# Patient Record
Sex: MALE | Race: ASIAN | NOT HISPANIC OR LATINO | Employment: FULL TIME | URBAN - METROPOLITAN AREA
[De-identification: names, ages, dates, MRNs, and addresses within clinical notes are randomized per-mention and may not be internally consistent; named-entity substitution may affect disease eponyms.]

---

## 2021-03-12 ENCOUNTER — IMMUNIZATION (OUTPATIENT)
Dept: PEDIATRICS | Facility: CLINIC | Age: 36
End: 2021-03-12
Payer: COMMERCIAL

## 2021-03-12 PROCEDURE — 0001A PR COVID VAC PFIZER DIL RECON 30 MCG/0.3 ML IM: CPT

## 2021-03-12 PROCEDURE — 91300 PR COVID VAC PFIZER DIL RECON 30 MCG/0.3 ML IM: CPT

## 2021-04-02 ENCOUNTER — IMMUNIZATION (OUTPATIENT)
Dept: PEDIATRICS | Facility: CLINIC | Age: 36
End: 2021-04-02
Attending: INTERNAL MEDICINE
Payer: COMMERCIAL

## 2021-04-02 PROCEDURE — 0002A PR COVID VAC PFIZER DIL RECON 30 MCG/0.3 ML IM: CPT

## 2021-04-02 PROCEDURE — 91300 PR COVID VAC PFIZER DIL RECON 30 MCG/0.3 ML IM: CPT

## 2021-04-03 ENCOUNTER — HEALTH MAINTENANCE LETTER (OUTPATIENT)
Age: 36
End: 2021-04-03

## 2021-09-18 ENCOUNTER — HEALTH MAINTENANCE LETTER (OUTPATIENT)
Age: 36
End: 2021-09-18

## 2021-12-23 ENCOUNTER — IMMUNIZATION (OUTPATIENT)
Dept: NURSING | Facility: CLINIC | Age: 36
End: 2021-12-23
Payer: COMMERCIAL

## 2021-12-23 PROCEDURE — 0064A COVID-19,PF,MODERNA (18+ YRS BOOSTER .25ML): CPT

## 2021-12-23 PROCEDURE — 91306 COVID-19,PF,MODERNA (18+ YRS BOOSTER .25ML): CPT

## 2022-04-24 ENCOUNTER — HEALTH MAINTENANCE LETTER (OUTPATIENT)
Age: 37
End: 2022-04-24

## 2022-11-19 ENCOUNTER — HEALTH MAINTENANCE LETTER (OUTPATIENT)
Age: 37
End: 2022-11-19

## 2023-06-01 ENCOUNTER — HEALTH MAINTENANCE LETTER (OUTPATIENT)
Age: 38
End: 2023-06-01

## 2024-03-08 ENCOUNTER — OFFICE VISIT (OUTPATIENT)
Dept: FAMILY MEDICINE | Facility: CLINIC | Age: 39
End: 2024-03-08
Payer: COMMERCIAL

## 2024-03-08 VITALS
HEIGHT: 65 IN | BODY MASS INDEX: 35.99 KG/M2 | SYSTOLIC BLOOD PRESSURE: 119 MMHG | OXYGEN SATURATION: 100 % | HEART RATE: 81 BPM | DIASTOLIC BLOOD PRESSURE: 74 MMHG | TEMPERATURE: 98.1 F | WEIGHT: 216 LBS | RESPIRATION RATE: 18 BRPM

## 2024-03-08 DIAGNOSIS — Z23 NEED FOR TDAP VACCINATION: ICD-10-CM

## 2024-03-08 DIAGNOSIS — Z00.00 ROUTINE GENERAL MEDICAL EXAMINATION AT A HEALTH CARE FACILITY: Primary | ICD-10-CM

## 2024-03-08 DIAGNOSIS — Z00.00 VISIT FOR PREVENTIVE HEALTH EXAMINATION: ICD-10-CM

## 2024-03-08 DIAGNOSIS — Z11.59 NEED FOR HEPATITIS C SCREENING TEST: ICD-10-CM

## 2024-03-08 DIAGNOSIS — Z13.220 SCREENING FOR HYPERLIPIDEMIA: ICD-10-CM

## 2024-03-08 DIAGNOSIS — Z71.89 ADVANCE CARE PLANNING: ICD-10-CM

## 2024-03-08 DIAGNOSIS — Z23 NEED FOR HEPATITIS B VACCINATION: ICD-10-CM

## 2024-03-08 DIAGNOSIS — Z11.4 ENCOUNTER FOR SCREENING FOR HIV: ICD-10-CM

## 2024-03-08 DIAGNOSIS — E66.01 MORBID OBESITY (H): ICD-10-CM

## 2024-03-08 LAB
BASOPHILS # BLD AUTO: 0 10E3/UL (ref 0–0.2)
BASOPHILS NFR BLD AUTO: 0 %
EOSINOPHIL # BLD AUTO: 0.2 10E3/UL (ref 0–0.7)
EOSINOPHIL NFR BLD AUTO: 3 %
ERYTHROCYTE [DISTWIDTH] IN BLOOD BY AUTOMATED COUNT: 12.6 % (ref 10–15)
HCT VFR BLD AUTO: 42.3 % (ref 40–53)
HGB BLD-MCNC: 14.4 G/DL (ref 13.3–17.7)
IMM GRANULOCYTES # BLD: 0 10E3/UL
IMM GRANULOCYTES NFR BLD: 1 %
LYMPHOCYTES # BLD AUTO: 1.9 10E3/UL (ref 0.8–5.3)
LYMPHOCYTES NFR BLD AUTO: 30 %
MCH RBC QN AUTO: 28.7 PG (ref 26.5–33)
MCHC RBC AUTO-ENTMCNC: 34 G/DL (ref 31.5–36.5)
MCV RBC AUTO: 84 FL (ref 78–100)
MONOCYTES # BLD AUTO: 0.4 10E3/UL (ref 0–1.3)
MONOCYTES NFR BLD AUTO: 6 %
NEUTROPHILS # BLD AUTO: 3.8 10E3/UL (ref 1.6–8.3)
NEUTROPHILS NFR BLD AUTO: 60 %
PLATELET # BLD AUTO: 270 10E3/UL (ref 150–450)
RBC # BLD AUTO: 5.02 10E6/UL (ref 4.4–5.9)
WBC # BLD AUTO: 6.4 10E3/UL (ref 4–11)

## 2024-03-08 PROCEDURE — 86803 HEPATITIS C AB TEST: CPT | Performed by: NURSE PRACTITIONER

## 2024-03-08 PROCEDURE — 80061 LIPID PANEL: CPT | Performed by: NURSE PRACTITIONER

## 2024-03-08 PROCEDURE — 80053 COMPREHEN METABOLIC PANEL: CPT | Performed by: NURSE PRACTITIONER

## 2024-03-08 PROCEDURE — 99385 PREV VISIT NEW AGE 18-39: CPT | Mod: 25 | Performed by: NURSE PRACTITIONER

## 2024-03-08 PROCEDURE — 90746 HEPB VACCINE 3 DOSE ADULT IM: CPT | Performed by: NURSE PRACTITIONER

## 2024-03-08 PROCEDURE — 85025 COMPLETE CBC W/AUTO DIFF WBC: CPT | Performed by: NURSE PRACTITIONER

## 2024-03-08 PROCEDURE — 87389 HIV-1 AG W/HIV-1&-2 AB AG IA: CPT | Performed by: NURSE PRACTITIONER

## 2024-03-08 PROCEDURE — 90715 TDAP VACCINE 7 YRS/> IM: CPT | Performed by: NURSE PRACTITIONER

## 2024-03-08 PROCEDURE — 90472 IMMUNIZATION ADMIN EACH ADD: CPT | Performed by: NURSE PRACTITIONER

## 2024-03-08 PROCEDURE — 36415 COLL VENOUS BLD VENIPUNCTURE: CPT | Performed by: NURSE PRACTITIONER

## 2024-03-08 PROCEDURE — 90471 IMMUNIZATION ADMIN: CPT | Performed by: NURSE PRACTITIONER

## 2024-03-08 SDOH — HEALTH STABILITY: PHYSICAL HEALTH: ON AVERAGE, HOW MANY DAYS PER WEEK DO YOU ENGAGE IN MODERATE TO STRENUOUS EXERCISE (LIKE A BRISK WALK)?: 3 DAYS

## 2024-03-08 ASSESSMENT — PAIN SCALES - GENERAL: PAINLEVEL: NO PAIN (0)

## 2024-03-08 ASSESSMENT — SOCIAL DETERMINANTS OF HEALTH (SDOH): HOW OFTEN DO YOU GET TOGETHER WITH FRIENDS OR RELATIVES?: ONCE A WEEK

## 2024-03-08 NOTE — PATIENT INSTRUCTIONS
Preventive Care Advice   This is general advice given by our system to help you stay healthy. However, your care team may have specific advice just for you. Please talk to your care team about your preventive care needs.  Nutrition  Eat 5 or more servings of fruits and vegetables each day.  Try wheat bread, brown rice and whole grain pasta (instead of white bread, rice, and pasta).  Get enough calcium and vitamin D. Check the label on foods and aim for 100% of the RDA (recommended daily allowance).  Lifestyle  Exercise at least 150 minutes each week   (30 minutes a day, 5 days a week).  Do muscle strengthening activities 2 days a week. These help control your weight and prevent disease.  No smoking.  Wear sunscreen to prevent skin cancer.  Have a dental exam and cleaning every 6 months.  Yearly exams  See your health care team every year to talk about:  Any changes in your health.  Any medicines your care team has prescribed.  Preventive care, family planning, and ways to prevent chronic diseases.  Shots (vaccines)   HPV shots (up to age 26), if you've never had them before.  Hepatitis B shots (up to age 59), if you've never had them before.  COVID-19 shot: Get this shot when it's due.  Flu shot: Get a flu shot every year.  Tetanus shot: Get a tetanus shot every 10 years.  Pneumococcal, hepatitis A, and RSV shots: Ask your care team if you need these based on your risk.  Shingles shot (for age 50 and up).  General health tests  Diabetes screening:  Starting at age 35, Get screened for diabetes at least every 3 years.  If you are younger than age 35, ask your care team if you should be screened for diabetes.  Cholesterol test: At age 39, start having a cholesterol test every 5 years, or more often if advised.  Bone density scan (DEXA): At age 50, ask your care team if you should have this scan for osteoporosis (brittle bones).  Hepatitis C: Get tested at least once in your life.  STIs (sexually transmitted  infections)  Before age 24: Ask your care team if you should be screened for STIs.  After age 24: Get screened for STIs if you're at risk. You are at risk for STIs (including HIV) if:  You are sexually active with more than one person.  You don't use condoms every time.  You or a partner was diagnosed with a sexually transmitted infection.  If you are at risk for HIV, ask about PrEP medicine to prevent HIV.  Get tested for HIV at least once in your life, whether you are at risk for HIV or not.  Cancer screening tests  Cervical cancer screening: If you have a cervix, begin getting regular cervical cancer screening tests at age 21. Most people who have regular screenings with normal results can stop after age 65. Talk about this with your provider.  Breast cancer scan (mammogram): If you've ever had breasts, begin having regular mammograms starting at age 40. This is a scan to check for breast cancer.  Colon cancer screening: It is important to start screening for colon cancer at age 45.  Have a colonoscopy test every 10 years (or more often if you're at risk) Or, ask your provider about stool tests like a FIT test every year or Cologuard test every 3 years.  To learn more about your testing options, visit: https://www.KPA/355880.pdf.  For help making a decision, visit: https://bit.ly/ur90051.  Prostate cancer screening test: If you have a prostate and are age 55 to 69, ask your provider if you would benefit from a yearly prostate cancer screening test.  Lung cancer screening: If you are a current or former smoker age 50 to 80, ask your care team if ongoing lung cancer screenings are right for you.  For informational purposes only. Not to replace the advice of your health care provider. Copyright   2023 [employer]. All rights reserved. Clinically reviewed by the [facility]. [employer] 267471 - REV 01/24.    Substance Use Disorder: Care Instructions  Overview     You can  improve your life and health by stopping your use of alcohol or drugs. When you don't drink or use drugs, you may feel and sleep better. You may get along better with your family, friends, and coworkers. There are medicines and programs that can help with substance use disorder.  How can you care for yourself at home?  Here are some ways to help you stay sober and prevent relapse.  If you have been given medicine to help keep you sober or reduce your cravings, be sure to take it exactly as prescribed.  Talk to your doctor about programs that can help you stop using drugs or drinking alcohol.  Do not keep alcohol or drugs in your home.  Plan ahead. Think about what you'll say if other people ask you to drink or use drugs. Try not to spend time with people who drink or use drugs.  Use the time and money spent on drinking or drugs to do something that's important to you.  Preventing a relapse  Have a plan to deal with relapse. Learn to recognize changes in your thinking that lead you to drink or use drugs. Get help before you start to drink or use drugs again.  Try to stay away from situations, friends, or places that may lead you to drink or use drugs.  If you feel the need to drink alcohol or use drugs again, seek help right away. Call a trusted friend or family member. Some people get support from organizations such as Narcotics Anonymous or [employer] or from treatment facilities.  If you relapse, get help as soon as you can. Some people make a plan with another person that outlines what they want that person to do for them if they relapse. The plan usually includes how to handle the relapse and who to notify in case of relapse.  Don't give up. Remember that a relapse doesn't mean that you have failed. Use the experience to learn the triggers that lead you to drink or use drugs. Then quit again. Recovery is a lifelong process. Many people have several relapses before they are able to quit for good.  Follow-up  "care is a rider part of your treatment and safety. Be sure to make and go to all appointments, and call your doctor if you are having problems. It's also a good idea to know your test results and keep a list of the medicines you take.  When should you call for help?   Call 911  anytime you think you may need emergency care. For example, call if you or someone else:    Has overdosed or has withdrawal signs. Be sure to tell the emergency workers that you are or someone else is using or trying to quit using drugs. Overdose or withdrawal signs may include:  Losing consciousness.  Seizure.  Seeing or hearing things that aren't there (hallucinations).     Is thinking or talking about suicide or harming others.   Where to get help 24 hours a day, 7 days a week   If you or someone you know talks about suicide, self-harm, a mental health crisis, a substance use crisis, or any other kind of emotional distress, get help right away. You can:    Call the Suicide and Crisis Lifeline at 988.     Call 2-905-397-TALK (1-605.627.3493).     Text HOME to 360805 to access the Crisis Text Line.   Consider saving these numbers in your phone.  Go to [employer] for more information or to chat online.  Call your doctor now or seek immediate medical care if:    You are having withdrawal symptoms. These may include nausea or vomiting, sweating, shakiness, and anxiety.   Watch closely for changes in your health, and be sure to contact your doctor if:    You have a relapse.     You need more help or support to stop.   Where can you learn more?  Go to https://www.healthinfibond.net/patiented  Enter H573 in the search box to learn more about \"Substance Use Disorder: Care Instructions.\"  Current as of: March 21, 2023               Content Version: 13.8    6570-8447 [employer].   Care instructions adapted under license by your healthcare professional. If you have questions about a medical condition or this instruction, always ask your " healthcare professional. [employer] disclaims any warranty or liability for your use of this information.

## 2024-03-08 NOTE — PROGRESS NOTES
"Preventive Care Visit  [facility] DORI Spicer CNP, Family Medicine  Mar 8, 2024    Assessment & Plan     (Z00.00) Routine general medical examination at a health care facility  (primary encounter diagnosis)  Plan: CBC with platelets and differential,         Comprehensive metabolic panel (BMP + Alb, Alk         Phos, ALT, AST, Total. Bili, TP)  -Reviewed recommendations for screening, immunizations, and discussed healthy lifestyle choices.  -F/u 1 year for next annual physical  - RTO sooner prn     (E66.01) Morbid obesity (H)  Plan: Nutrition Referral  - Avoid sugary drinks / juice.   - Vegetables most days.   - Do at least 150 minutes a week of physical activity.       (Z00.00) Visit for preventive health examination  Plan: HIV Antigen Antibody Combo [BIC3713], Hepatitis        C antibody [WOR698]    (Z11.4) Encounter for screening for HIV  Plan: HIV Antigen Antibody Combo [[id number]]    (Z11.59) Need for hepatitis C screening test  Plan: Hepatitis C antibody [[id number]]*    (Z13.220) Screening for hyperlipidemia  Plan: Lipid panel reflex to direct LDL Non-fasting    (Z23) Need for Tdap vaccination  Plan: TDAP 7+ (ADACEL,BOOSTRIX), SCREENING QUESTIONS         FOR ADULT IMMUNIZATIONS    (Z23) Need for hepatitis B vaccination  Plan: HEPATITIS B, ADULT 20+ (ENGERIX-B/RECOMBIVAX         HB), SCREENING QUESTIONS FOR ADULT         IMMUNIZATIONS                 BMI  Estimated body mass index is 35.94 kg/m  as calculated from the following:    Height as of this encounter: 1.651 m (5' 5\").    Weight as of this encounter: 98 kg (216 lb).   Weight management plan: Discussed healthy diet and exercise guidelines    Counseling  Appropriate preventive services were discussed with this patient, including applicable screening as appropriate for fall prevention, nutrition, physical activity, Tobacco-use cessation, weight loss and cognition.  Checklist reviewing preventive services available has been given to " the patient.  Reviewed patient's diet, addressing concerns and/or questions.   He is at risk for lack of exercise and has been provided with information to increase physical activity for the benefit of his well-being.       Work on weight loss  Regular exercise  See Patient Instructions    Franklin lizama is a 38 year old, presenting for the following:  Physical        3/8/2024     3:10 PM   Additional Questions   Roomed by Sujey Valdez MA   Accompanied by Self        Health Care Directive  Patient does not have a Health Care Directive or Living Will: Discussed advance care planning with patient; information given to patient to review.    SHANTANU Casiano is a  38-year-old male presents for a complete physical examination. He reports no specific medical concerns at this time and denies any symptoms. The patient is not currently taking any medications. He acknowledges that he is not up to date with his immunizations. There is no family history of colon cancer or prostate cancer.              3/8/2024   General Health   How would you rate your overall physical health? (!) FAIR   Feel stress (tense, anxious, or unable to sleep) Not at all         3/8/2024   Nutrition   Three or more servings of calcium each day? (!) NO   Diet: Regular (no restrictions)   How many servings of fruit and vegetables per day? (!) 2-3   How many sweetened beverages each day? (!) 2         3/8/2024   Exercise   Days per week of moderate/strenous exercise 3 days         3/8/2024   Social Factors   Frequency of gathering with friends or relatives Once a week   Worry food won't last until get money to buy more No   Food not last or not have enough money for food? No   Do you have housing?  Yes   Are you worried about losing your housing? No   Lack of transportation? No   Unable to get utilities (heat,electricity)? No         3/8/2024   Dental   Dentist two times every year? Yes         3/8/2024   TB Screening   Were you born outside of [place]?  No          Today's PHQ-2 Score:       3/8/2024     3:02 PM   PHQ-2 ( 1999 [employer]izer)   Q1: Little interest or pleasure in doing things 0   Q2: Feeling down, depressed or hopeless 0   PHQ-2 Score 0   Q1: Little interest or pleasure in doing things Not at all   Q2: Feeling down, depressed or hopeless Not at all   PHQ-2 Score 0           3/8/2024   Substance Use   Alcohol more than 3/day or more than 7/wk No   Do you use any other substances recreationally? (!) ALCOHOL     Social History     Tobacco Use    Smoking status: Former     Packs/day: 0.50     Years: 5.00     Additional pack years: 0.00     Total pack years: 2.50     Types: Cigarettes     Passive exposure: Never    Smokeless tobacco: Never    Tobacco comments:     Quit for a few years now   Vaping Use    Vaping Use: Never used   Substance Use Topics    Alcohol use: Yes    Drug use: No             3/8/2024   One time HIV Screening   Previous HIV test? No         3/8/2024   STI Screening   New sexual partner(s) since last STI/HIV test? No         3/8/2024   Contraception/Family Planning   Questions about contraception or family planning No        Reviewed and updated as needed this visit by Provider                    No past medical history on file.  No past surgical history on file.  OB History   No obstetric history on file.     Labs reviewed in EPIC  BP Readings from Last 3 Encounters:   03/08/24 119/74   11/10/15 118/68   10/22/15 116/66    Wt Readings from Last 3 Encounters:   03/08/24 98 kg (216 lb)   10/22/15 87.1 kg (192 lb)                  Patient Active Problem List   Diagnosis    Epididymal cyst    Morbid obesity (H)     No past surgical history on file.    Social History     Tobacco Use    Smoking status: Former     Packs/day: 0.50     Years: 5.00     Additional pack years: 0.00     Total pack years: 2.50     Types: Cigarettes     Passive exposure: Never    Smokeless tobacco: Never    Tobacco comments:     Quit for a few years now   Substance Use Topics  "   Alcohol use: Yes     Family History   Problem Relation Age of Onset    Hypertension Mother     Hyperlipidemia Mother     Cerebrovascular Disease Mother          No current outpatient medications on file.     No Known Allergies  No lab results found.       Review of Systems  CONSTITUTIONAL: NEGATIVE for fever, chills, change in weight  INTEGUMENTARY/SKIN: NEGATIVE for worrisome rashes, moles or lesions  EYES: NEGATIVE for vision changes or irritation  ENT/MOUTH: NEGATIVE for ear, mouth and throat problems  RESP: NEGATIVE for significant cough or SOB  BREAST: NEGATIVE for masses, tenderness or discharge  CV: NEGATIVE for chest pain, palpitations or peripheral edema  GI: NEGATIVE for nausea, abdominal pain, heartburn, or change in bowel habits  : NEGATIVE for frequency, dysuria, or hematuria  MUSCULOSKELETAL: NEGATIVE for significant arthralgias or myalgia  NEURO: NEGATIVE for weakness, dizziness or paresthesias  ENDOCRINE: NEGATIVE for temperature intolerance, skin/hair changes  HEME: NEGATIVE for bleeding problems  PSYCHIATRIC: NEGATIVE for changes in mood or affect     Objective    Exam  /74 (BP Location: Left arm, Patient Position: Chair, Cuff Size: Adult Large)   Pulse 81   Temp 98.1  F (36.7  C) (Oral)   Resp 18   Ht 1.651 m (5' 5\")   Wt 98 kg (216 lb)   SpO2 100%   BMI 35.94 kg/m     Estimated body mass index is 35.94 kg/m  as calculated from the following:    Height as of this encounter: 1.651 m (5' 5\").    Weight as of this encounter: 98 kg (216 lb).    Physical Exam  GENERAL: alert and no distress  EYES: Eyes grossly normal to inspection, PERRL and conjunctivae and sclerae normal  HENT: ear canals and TM's normal, nose and mouth without ulcers or lesions  NECK: no adenopathy, no asymmetry, masses, or scars  RESP: lungs clear to auscultation - no rales, rhonchi or wheezes  CV: regular rate and rhythm, normal S1 S2, no S3 or S4, no murmur, click or rub, no peripheral edema  ABDOMEN: soft, " nontender, no hepatosplenomegaly, no masses and bowel sounds normal  MS: no gross musculoskeletal defects noted, no edema  SKIN: no suspicious lesions or rashes  NEURO: Normal strength and tone, mentation intact and speech normal  PSYCH: mentation appears normal, affect normal/bright      Signed Electronically by: DORI Malloy CNP

## 2024-03-08 NOTE — LETTER
March 11, 2024    alda Casiano  [address]  [place] [address]          Dear ,    We are writing to inform you of your test results.    Your blood count is normal.  There is no anemia or abnormalities suggesting infection or other problems.     Please contact the clinic if you have additional questions.  Thank you.       Resulted Orders   CBC with platelets and differential   Result Value Ref Range    WBC Count 6.4 4.0 - 11.0 10e3/uL    RBC Count 5.02 4.40 - 5.90 10e6/uL    Hemoglobin 14.4 13.3 - 17.7 g/dL    Hematocrit 42.3 40.0 - 53.0 %    MCV 84 78 - 100 fL    MCH 28.7 26.5 - 33.0 pg    MCHC 34.0 31.5 - 36.5 g/dL    RDW 12.6 10.0 - 15.0 %    Platelet Count 270 150 - 450 10e3/uL    % Neutrophils 60 %    % Lymphocytes 30 %    % Monocytes 6 %    % Eosinophils 3 %    % Basophils 0 %    % Immature Granulocytes 1 %    Absolute Neutrophils 3.8 1.6 - 8.3 10e3/uL    Absolute Lymphocytes 1.9 0.8 - 5.3 10e3/uL    Absolute Monocytes 0.4 0.0 - 1.3 10e3/uL    Absolute Eosinophils 0.2 0.0 - 0.7 10e3/uL    Absolute Basophils 0.0 0.0 - 0.2 10e3/uL    Absolute Immature Granulocytes 0.0 <=0.4 10e3/uL           If you have any questions or concerns, please call the clinic at the number listed above.       Sincerely,      DORI Malloy CNP

## 2024-03-09 LAB
ALBUMIN SERPL BCG-MCNC: 4.5 G/DL (ref 3.5–5.2)
ALP SERPL-CCNC: 69 U/L (ref 40–150)
ALT SERPL W P-5'-P-CCNC: 41 U/L (ref 0–70)
ANION GAP SERPL CALCULATED.3IONS-SCNC: 8 MMOL/L (ref 7–15)
AST SERPL W P-5'-P-CCNC: 21 U/L (ref 0–45)
BILIRUB SERPL-MCNC: 0.3 MG/DL
BUN SERPL-MCNC: 16.1 MG/DL (ref 6–20)
CALCIUM SERPL-MCNC: 9.2 MG/DL (ref 8.6–10)
CHLORIDE SERPL-SCNC: 107 MMOL/L (ref 98–107)
CHOLEST SERPL-MCNC: 249 MG/DL
CREAT SERPL-MCNC: 0.77 MG/DL (ref 0.67–1.17)
DEPRECATED HCO3 PLAS-SCNC: 26 MMOL/L (ref 22–29)
EGFRCR SERPLBLD CKD-EPI 2021: >90 ML/MIN/1.73M2
FASTING STATUS PATIENT QL REPORTED: NO
GLUCOSE SERPL-MCNC: 108 MG/DL (ref 70–99)
HCV AB SERPL QL IA: NONREACTIVE
HDLC SERPL-MCNC: 44 MG/DL
HIV 1+2 AB+HIV1 P24 AG SERPL QL IA: NONREACTIVE
LDLC SERPL CALC-MCNC: 133 MG/DL
NONHDLC SERPL-MCNC: 205 MG/DL
POTASSIUM SERPL-SCNC: 3.9 MMOL/L (ref 3.4–5.3)
PROT SERPL-MCNC: 7.3 G/DL (ref 6.4–8.3)
SODIUM SERPL-SCNC: 141 MMOL/L (ref 135–145)
TRIGL SERPL-MCNC: 361 MG/DL

## 2024-03-11 ENCOUNTER — TELEPHONE (OUTPATIENT)
Dept: FAMILY MEDICINE | Facility: CLINIC | Age: 39
End: 2024-03-11
Payer: COMMERCIAL

## 2024-03-11 NOTE — TELEPHONE ENCOUNTER
----- Message from DORI Malloy CNP sent at 3/10/2024 12:10 PM CDT -----  Please tell him  Mr. Casiano,    Your LDL (bad cholesterol)  was above goal.  Also, your triglycerides were above normal.  Poor diet, genetics and being overweight can contribute to this.  1000mg daily of omega-3 fatty acids may improve this. Maintaining a healthy diet with lean proteins, whole grains and healthy fats such as olive oil as well as regular exercise and maintaining an appropriate weight all contribute to healthier cholesterol levels.    All of the rest of your test results were within the expected range for you.    Please contact the clinic if you have additional questions.  Thank you.    Sincerely,    DORI Malloy CNP

## 2024-03-11 NOTE — TELEPHONE ENCOUNTER
Attempt #1 to call patient.     RN left voicemail and requested return call to [facility] Mirna Vazquez RN, BSN  [facility]

## 2024-03-12 NOTE — TELEPHONE ENCOUNTER
Patient viewed lab result message on 3/11/24 2:17pm     Paola Massey RN  [facility]

## 2024-04-12 ENCOUNTER — OFFICE VISIT (OUTPATIENT)
Dept: FAMILY MEDICINE | Facility: CLINIC | Age: 39
End: 2024-04-12
Payer: COMMERCIAL

## 2024-04-12 VITALS
OXYGEN SATURATION: 98 % | BODY MASS INDEX: 35.49 KG/M2 | HEIGHT: 65 IN | TEMPERATURE: 98 F | DIASTOLIC BLOOD PRESSURE: 71 MMHG | WEIGHT: 213 LBS | SYSTOLIC BLOOD PRESSURE: 108 MMHG | HEART RATE: 84 BPM | RESPIRATION RATE: 18 BRPM

## 2024-04-12 DIAGNOSIS — E78.5 DYSLIPIDEMIA: ICD-10-CM

## 2024-04-12 DIAGNOSIS — L74.0 HEAT RASH: Primary | ICD-10-CM

## 2024-04-12 DIAGNOSIS — G89.29 CHRONIC LEFT-SIDED LOW BACK PAIN WITHOUT SCIATICA: ICD-10-CM

## 2024-04-12 DIAGNOSIS — M54.50 CHRONIC LEFT-SIDED LOW BACK PAIN WITHOUT SCIATICA: ICD-10-CM

## 2024-04-12 PROCEDURE — 99214 OFFICE O/P EST MOD 30 MIN: CPT | Performed by: NURSE PRACTITIONER

## 2024-04-12 PROCEDURE — G2211 COMPLEX E/M VISIT ADD ON: HCPCS | Performed by: NURSE PRACTITIONER

## 2024-04-12 RX ORDER — CALAMINE
LOTION (ML) TOPICAL
Qty: 90 ML | Refills: 0 | Status: SHIPPED | OUTPATIENT
Start: 2024-04-12

## 2024-04-12 RX ORDER — CYCLOBENZAPRINE HCL 10 MG
10 TABLET ORAL
Qty: 20 TABLET | Refills: 0 | Status: SHIPPED | OUTPATIENT
Start: 2024-04-12

## 2024-04-12 RX ORDER — IBUPROFEN 400 MG/1
400 TABLET, FILM COATED ORAL EVERY 12 HOURS PRN
Qty: 60 TABLET | Refills: 0 | Status: SHIPPED | OUTPATIENT
Start: 2024-04-12

## 2024-04-12 RX ORDER — METAPROTERENOL SULFATE 10 MG
500 TABLET ORAL DAILY
Qty: 90 CAPSULE | Refills: 0 | Status: SHIPPED | OUTPATIENT
Start: 2024-04-12

## 2024-04-12 RX ORDER — TRIAMCINOLONE ACETONIDE 1 MG/G
CREAM TOPICAL 2 TIMES DAILY
Qty: 30 G | Refills: 0 | Status: SHIPPED | OUTPATIENT
Start: 2024-04-12

## 2024-04-12 SDOH — HEALTH STABILITY: PHYSICAL HEALTH: ON AVERAGE, HOW MANY MINUTES DO YOU ENGAGE IN EXERCISE AT THIS LEVEL?: 10 MIN

## 2024-04-12 SDOH — HEALTH STABILITY: PHYSICAL HEALTH: ON AVERAGE, HOW MANY DAYS PER WEEK DO YOU ENGAGE IN MODERATE TO STRENUOUS EXERCISE (LIKE A BRISK WALK)?: 7 DAYS

## 2024-04-12 ASSESSMENT — SOCIAL DETERMINANTS OF HEALTH (SDOH): HOW OFTEN DO YOU GET TOGETHER WITH FRIENDS OR RELATIVES?: ONCE A WEEK

## 2024-04-12 ASSESSMENT — PAIN SCALES - GENERAL: PAINLEVEL: NO PAIN (0)
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
## 2024-04-12 NOTE — PATIENT INSTRUCTIONS
- keep the affected area cool and dry.  - Recommend calamine lotion or Kenalog cream for symptomatic relief.  - Suggest loose-fitting clothing to prevent further irritation.  - Follow-up if rash worsens or does not improve in 1 week.      Back pain  - Ice to back 2-3 times per day  -Lifting mechanics  -Analgesics such as Tylenol and/or ibuprofen. Short term courses of narcotic medications may be helpful in some cases  but long term use often leads to tolerance and a lack of benefit over time   -Back exercises, instruction sheet given.  Get moving as early as possible.    -Aerobic exercise program, this was strongly encouraged as one of the best ways to manage chronic back ache  - PT if not improving with the above therapies

## 2024-04-12 NOTE — PROGRESS NOTES
Preventive Care Visit  [facility] VANDANA Mao, APRN CNP, Family Medicine  Apr 12, 2024  {Provider  Link to SmartSet :839320}    {PROVIDER CHARTING PREFERENCE:127334}    Franklin lizama is a 38 year old, presenting for the following:  Physical        4/12/2024     4:00 PM   Additional Questions   Roomed by Sujey Valdez MA   Accompanied by Self        Health Care Directive  Patient does not have a Health Care Directive or Living Will: {ADVANCE_DIRECTIVE_STATUS:481816}    HPI  ***  {MA/LPN/RN Pre-Provider Visit Orders- hCG/UA/Strep (Optional):901430}  {SUPERLIST (Optional):025583}  {additonal problems for provider to add (Optional):633116}      4/12/2024   General Health   How would you rate your overall physical health? (!) FAIR   Feel stress (tense, anxious, or unable to sleep) To some extent   (!) STRESS CONCERN      4/12/2024   Nutrition   Three or more servings of calcium each day? Yes   Diet: Regular (no restrictions)   How many servings of fruit and vegetables per day? (!) 0-1   How many sweetened beverages each day? (!) 2         4/12/2024   Exercise   Days per week of moderate/strenous exercise 7 days   Average minutes spent exercising at this level 10 min         4/12/2024   Social Factors   Frequency of gathering with friends or relatives Once a week   Worry food won't last until get money to buy more No   Food not last or not have enough money for food? No   Do you have housing?  Yes   Are you worried about losing your housing? No   Lack of transportation? No   Unable to get utilities (heat,electricity)? No         4/12/2024   Dental   Dentist two times every year? Yes         4/12/2024   TB Screening   Were you born outside of the [place]? No       {Rooming Staff Patient needs a PHQ as part of the AWV.  Use this link to complete and then refresh the note to pull results Link to PHQ2 Assessment :780646}  {USE TO PULL IN PHQ RESULTS FOR TODAY:684208}      4/12/2024   Substance Use  "  Alcohol more than 3/day or more than 7/wk No   Do you use any other substances recreationally? (!) SYNTHETIC MARIJUANA     Social History     Tobacco Use    Smoking status: Former     Current packs/day: 0.50     Average packs/day: 0.5 packs/day for 5.0 years (2.5 ttl pk-yrs)     Types: Cigarettes     Passive exposure: Never    Smokeless tobacco: Never    Tobacco comments:     Quit for a few years now   Vaping Use    Vaping status: Never Used   Substance Use Topics    Alcohol use: Yes    Drug use: No     {Provider  If there are gaps in the social history shown above, please follow the link to update and then refresh the note Link to Social and Substance History :751752}      4/12/2024   STI Screening   New sexual partner(s) since last STI/HIV test? No         4/12/2024   Contraception/Family Planning   Questions about contraception or family planning No     {Provider  Use the storyboard to review patient history, after sections have been marked as reviewed, refresh note to capture documentation:519344}   Reviewed and updated as needed this visit by Provider                    {HISTORY OPTIONS (Optional):690896}    {ROS Picklists (Optional):050613}     Objective    Exam  /71 (BP Location: Left arm, Patient Position: Chair, Cuff Size: Adult Large)   Pulse 84   Temp 98  F (36.7  C) (Temporal)   Resp 18   Ht 1.651 m (5' 5\")   Wt 96.6 kg (213 lb)   SpO2 98%   BMI 35.45 kg/m     Estimated body mass index is 35.45 kg/m  as calculated from the following:    Height as of this encounter: 1.651 m (5' 5\").    Weight as of this encounter: 96.6 kg (213 lb).    Physical Exam  {Exam Choices (Optional):131396}        Signed Electronically by: DORI Malloy CNP  {Email feedback regarding this note to [email or web address]   :314488}  "

## 2024-07-10 ENCOUNTER — OFFICE VISIT (OUTPATIENT)
Dept: URGENT CARE | Facility: URGENT CARE | Age: 39
End: 2024-07-10
Payer: COMMERCIAL

## 2024-07-10 ENCOUNTER — TELEPHONE (OUTPATIENT)
Dept: DERMATOLOGY | Facility: CLINIC | Age: 39
End: 2024-07-10

## 2024-07-10 VITALS
RESPIRATION RATE: 18 BRPM | SYSTOLIC BLOOD PRESSURE: 123 MMHG | TEMPERATURE: 98.4 F | OXYGEN SATURATION: 96 % | DIASTOLIC BLOOD PRESSURE: 80 MMHG | BODY MASS INDEX: 36.14 KG/M2 | HEART RATE: 79 BPM | WEIGHT: 217.2 LBS

## 2024-07-10 DIAGNOSIS — L50.9 URTICARIA: Primary | ICD-10-CM

## 2024-07-10 PROCEDURE — 99214 OFFICE O/P EST MOD 30 MIN: CPT | Performed by: STUDENT IN AN ORGANIZED HEALTH CARE EDUCATION/TRAINING PROGRAM

## 2024-07-10 RX ORDER — FAMOTIDINE 20 MG/1
20 TABLET, FILM COATED ORAL 2 TIMES DAILY
Status: DISCONTINUED | OUTPATIENT
Start: 2024-07-10 | End: 2024-07-10

## 2024-07-10 NOTE — LETTER
July 10, 2024      Kathy Casiano  [address] [address]        To Whom It May Concern:    Kathy Casiano was seen in our clinic. He may return to work without restrictions on 7/12.      Sincerely,        Adam Mcguire MD           Patient Information     Patient Name MRN Sex Selvin Duenas [id number] Male 1955      Progress Notes by Juan C Bess MD at 2017  3:00 PM     Author:  Juan C Bess MD Service:  (none) Author Type:  Physician     Filed:  2017  4:47 PM Encounter Date:  2017 Status:  Signed     :  Juan C Bess MD (Physician)            SUBJECTIVE:    Selvin An is a 62 y.o. male who presents for consultation regarding recent illness.    HPI Comments: Consultation is requested by Dr. Ramirez. This patient has a history that dates back about a year ago. He was having some trouble with muscle aches and pains and no energy. Lyme testing was apparently negative. In February of this year he had left facial numbness and droop. He was seen in the emergency room and diagnosed with Bell's palsy and was told that he had Lyme disease. He was treated for the Lyme disease because he also had muscle aches, fatigue and muscle cramps with doxycycline. He was also treated for the Bell's palsy. All of this seemed to slowly improve, the Bell's palsy deficits more than the Lyme disease symptoms as described.    A few weeks ago the patient had a sensation of muscle tightness in his face. It felt like his eye muscles were pulling up. He didn't think too much of it but he was also been working out in the backyard doing some gardening type work. He felt strange and had to sit down. He was short of breath and then actually passed out for short time. He got up and then passed out again. He was brought to the emergency room for further evaluation. He once again was treated for Lyme disease and Bell's palsy and was given a heart monitor. He remains on the doxycycline, acyclovir and prednisone at the present time. He has the Zio patch monitor on as well.    The patient has had a CT scan of the brain that was initially reported normal but an addendum subsequently showed a tubular extra-axial hyperdensity. This needs  further clarification. He's had a previous CTA done in February that was negative for any carotid disease. Chest x-ray is negative. EKG negative other than first-degree AV block. His lab shows an elevated white count. Initial lab in the emergency room recently showed an elevated renal panel and calcium which have since improved and normalized. White count remains elevated.    He has a continued symptoms of what he calls Lyme disease which includes the fatigue, muscle aches and muscle cramps. He also continues to have the facial abnormalities which included some weakness of his mouth and his forehead muscles but almost a tight feeling around his left eye. I should point out that sometime this summer the patient also went to the [facility] and had testing done, none of this was revealing. He did have some leukocytosis at that time that was not otherwise evaluated.    I spent time today reconciling medications. Past medical history, past surgical history, family history and social history were all updated.      No Known Allergies,   Current Outpatient Prescriptions     Medication  Sig     acyclovir (ZOVIRAX) 800 mg tablet Take 1 tablet by mouth 5 times daily for 10 days.     allopurinol (ZYLOPRIM) 300 mg tablet Take 1 tablet by mouth once daily.     aspirin (ECOTRIN) 81 mg enteric coated tablet Take 81 mg by mouth once daily with a meal.     atenolol (TENORMIN) 100 mg tablet Take 1.5 tablets by mouth once daily.     [DISCONTINUED] ATENOLOL 50 MG ORAL TAB take 1 tablet (50mg) by oral route once daily     atorvastatin (LIPITOR) 20 mg tablet Take 20 mg by mouth once daily.     doxycycline monohydrate (MONODOX) 100 mg capsule Take 1 capsule by mouth 2 times daily for 14 days.     levothyroxine (SYNTHROID) 200 mcg tablet Take 1 tablet by mouth before breakfast.     [DISCONTINUED] LEVOTHYROXINE SODIUM 25 MCG ORAL TAB take 1 tablet (25mcg) by oral route once daily     losartan (COZAAR) 100 mg tablet Take 1 tablet by mouth  once daily.     NIFEdipine (PROCARDIA XL) 30 mg Extended-Release tablet Take 30 mg by mouth once daily before a meal.     predniSONE (DELTASONE) 10 mg tablet 60 mg po daily x 3 days, then 50 mg po daily x 3 days, then 40 mg po daily x 3 days, then 30 mg po daily x 3 days, then 20 mg po daily x 3 days and finally 10 mg po daily until finished     spironolacton-hydrochlorothiaze, 25-25 mg, (ALDACTAZIDE) tablet 1 tablet twice a day     zolpidem (AMBIEN) 10 mg tablet Take 1 tablet by mouth at bedtime if needed for Sleep.     No current facility-administered medications for this visit.      Medications have been reviewed by me and are current to the best of my knowledge and ability. ,   Past Medical History:     Diagnosis  Date     Bilateral carpal tunnel syndrome      History of thyroid cancer     Papillary      Hyperlipidemia      Hypertension      Hypothyroidism      Papillary carcinoma of thyroid (HC) 1994     Sleep apnea    ,   Patient Active Problem List       Diagnosis  Date Noted     Bell's palsy  03/30/2017     Vitamin D deficiency  10/10/2016     Corrected          CAP (community acquired pneumonia)  01/30/2015     Occipital neuralgia  01/30/2015     Gout  06/03/2013     CARPAL TUNNEL SYNDROME  08/03/2011     HYPOTHYROIDISM  01/11/2011     HYPERLIPIDEMIA  07/12/2010     HYPERTENSION  02/17/2010     RENAL CALCULUS  02/17/2010     ERECTILE DYSFUNCTION  02/17/2010     SLEEP APNEA  02/17/2010     CARCINOMA, THYROID GLAND, PAPILLARY  02/17/1994     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 Schwartz - [facility]       EYE MUSCLE SURGERY  2010    strabismus        SPLENECTOMY       THYROIDECTOMY  1994    thyroid ca       TONSILLECTOMY  2004    T & A > 12yo       Ureteroscopy Laser Left  10/06/14    Dr. Lana LEHMAN       VASECTOMY      and   Social History       Substance Use Topics         Smoking status:   Never Smoker     Smokeless tobacco:   Never Used     Alcohol use   No      Comment: socially      Family Status     Relation  Status     Mother  at age 81     Father  at age 75    MI      Other      Brother Alive     Brother Alive     Brother Alive     Social History     Social History        Marital status:  Single     Spouse name: N/A     Number of children:  N/A     Years of education:  N/A     Occupational History        [employer]     Social History Main Topics         Smoking status:   Never Smoker     Smokeless tobacco:   Never Used     Alcohol use   No      Comment: socially      Drug use:   No     Sexual activity:   Yes     Partners:  Female     Other Topics  Concern     None      Social History Narrative     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    Lives at and manages [employer] in [place].                REVIEW OF SYSTEMS:  Review of Systems   All other systems reviewed and are negative.      OBJECTIVE:  /88  Pulse 64  Wt 129.7 kg (286 lb)  BMI 38.74 kg/m2    EXAM:   Physical Exam   Constitutional: He is oriented to person, place, and time and well-developed, well-nourished, and in no distress. No distress.   HENT:   Head: Normocephalic.   Right Ear: External ear normal.   Left Ear: External ear normal.   Mouth/Throat: No oropharyngeal exudate.   Eyes: Pupils are equal, round, and reactive to light.   Neck: Normal range of motion. Neck supple. No JVD present. No tracheal deviation present. No thyromegaly present.   Cardiovascular: Normal rate, regular rhythm and normal heart sounds.  Exam reveals no gallop and no friction rub.    No murmur heard.  Pulmonary/Chest: Effort normal and breath sounds normal. No respiratory distress. He has no wheezes. He has no rales.   Abdominal: Soft. Bowel sounds are normal. He exhibits  no distension and no mass. There is no tenderness. There is no rebound and no guarding.   Musculoskeletal: He exhibits no edema.   Lymphadenopathy:     He has no cervical adenopathy.   Neurological: He is alert and oriented to person, place, and time. Gait normal.   He has weakness of the corner of his mouth with attempts to smile. He also has weakness of his eyebrow on the left with attempts to raise that compared to the right. Tongue motion is normal. His eye actually seemed a little more squinted on the left than on the right which was somewhat unusual. No other focal deficits.   Skin: Skin is warm and dry. He is not diaphoretic.   Psychiatric: Affect normal.   Nursing note and vitals reviewed.      ASSESSMENT/PLAN:    ICD-10-CM    1. Bell's palsy G51.0 LYME SCREEN W/REFLEX      SEDIMENTATION RATE      AVERY TEST      LYME SCREEN W/REFLEX      SEDIMENTATION RATE      AVERY TEST   2. Syncope, unspecified syncope type R55 AMB CONSULT TO INTERNAL MEDICINE   3. Leukocytosis, unspecified type D72.829 PERIPHERAL SMEAR TO PATH      CBC WITH DIFFERENTIAL      CBC WITH DIFFERENTIAL      CBC WITH AUTO DIFFERENTIAL      PERIPHERAL SMEAR TO PATH      RETICULOCYTES      RETICULOCYTES   4. HYPERTENSION I10    5. Postoperative hypothyroidism E89.0    6. CARCINOMA, THYROID GLAND, PAPILLARY C73 T4,FREE      T4,FREE   7. Insomnia, unspecified type G47.00 LORazepam (ATIVAN) 1 mg tablet        Plan:  His current symptom complex is really somewhat confusing. Further evaluation certainly is warranted. Considering the addendum to his CT scan with a possible abnormality although more likely venous, MRI of the brain is certainly indicated. This will be scheduled once his COPD and monitor his off. I will certainly let him know the results of both of those tests. In addition, some lab is pending from today including a peripheral smear because of his leukocytosis. He currently is on prednisone therapy but his leukocytosis was present even prior  to that. Etiology of this is unclear. I will let him know the results and we will proceed as indicated. Consider rheumatology or neurology consultation if we do not find anything of significance. Of note is that I think that his syncopal episode was probably just vasovagal and exacerbated by his medications and dehydration as evidenced by his elevated renal panel when he was in the emergency room. Trial of lorazepam for his insomnia as the Ambien is not helping. He really thinks she'll feel a lot better if he is just able to get some sleep.

## 2024-07-10 NOTE — TELEPHONE ENCOUNTER
This encounter is being sent to inform the clinic that this patient has a referral from Adam Mcguire MD in  URGENT CARE for the diagnoses of Urticaria; Rash; Ongoing whole body rash since february, with new widespread urticaria and lip swelling and has requested that this patient be seen within Urgent: 3-5 Days and/or with Urgent: 3-5 Days. Based on the availability of our provider(s), we are unable to accommodate this request.    Were all sites offered this patient?  Yes  Pt requesting [facility] location only please due to where he lives - Thanks  Does scheduling algorithm request to schedule next available?  Patient appointment has not been scheduled.  Please review the referral request for accommodation and contact the patient.  If unable to accommodate, please resubmit a referral and indicate a preferred partner or affiliate location using Provider Finder or Scheduling Instructions field.       Referral Order in Epic  Records in Epic  No outside Records    Please review and call Pt to schedule    Thank you!

## 2024-07-10 NOTE — TELEPHONE ENCOUNTER
Pt called and scheduled for Monday 7/15 with Tam. Pt's wife stated they are on their way to the emergency room now.    Dominique Winslow RN on 7/10/2024 at 4:13 PM

## 2024-07-10 NOTE — PATIENT INSTRUCTIONS
Good seeing you in UC. I recommend ED evaluation for quicker lab tests and possibly some extended monitoring of your lip swelling for any worsening.

## 2024-07-10 NOTE — PROGRESS NOTES
Assessment & Plan     Urticaria  Patient with widespread urticaria of 1 day duration. Unclear etiology as there have been no new exposures noted. More concerning, patient has lip swelling concerning for angioedema. No evidence of anaphylaxis currently. Patient has received 2 doses of antihistamines. Unfortunately Pepcid is not available in clinic today. Recommended that patient go to emergency department for further evaluation management of possible angioedema. Likely requires some baseline labs including inflammatory markers as well as possibly some prolonged observation due to persistent lip swelling, hoarse voice. No evidence of intermittent upper airway compromise given no stridor, difficulty swallowing or difficulty breathing. Dermatology consult also placed due to prolonged rash symptoms as well as new onset urticaria on top of existing rash.  - Adult Dermatology  Referral  - Referred to ED    Return if symptoms worsen or fail to improve.    Adam Mcguire MD  [facility] [place] MELI lizama is a 38 year old male who presents to clinic today for the following health issues:  Chief Complaint   Patient presents with    Derm Problem     Hx of rashes and has had an all over rash since Feb, itchy, has tried steroid ointments, bleach baths, and using Benadryl but has never fully gone away, this morning woke up and both lips are swollen as well as right eyelid, last benadryl was 6am this morning        HPI    Patient reported symptoms of a widespread macular rash since February which improved with calamine, kenalog, bleach baths. Never fully resolved. New widespread urticaria starting yesterday, this morning noticed lip swelling and eye swelling.     Took a dose of benadryl which helped improve rash some. Denies any new detergents, colognes, or foods tried. No other new exposures noted.     Denies any fevers, dypsnea, difficulty swallowing managing, increased secretions.  No N/V/D or abdominal pain    No FH of angioedema, or recurrent facial swelling.        Objective    /80 (BP Location: Left arm, Patient Position: Sitting, Cuff Size: Adult Regular)   Pulse 79   Temp 98.4  F (36.9  C) (Tympanic)   Resp 18   Wt 98.5 kg (217 lb 3.2 oz)   SpO2 96%   BMI 36.14 kg/m    Physical Exam   Constitutional: No Acute Distress. Awake and alert  Eyes: anicteric, EOMI, PERRLA  ENT: Patient with significant lip swelling right greater than left . Otherwise oropharynx clear, no evidence of tongue or posterior pharynx swelling. MMM, no Cervical LAD  Respiratory: good air movement, clear to auscultation bilaterally, no crackles or wheezing  Cardiovascular: regular rate and rhythm, normal S1 and S2, no murmur noted  GI: normal bowel sounds, soft, non-distended, non-tender, no masses palpated, no hepatosplenomegaly  Skin: Widespread background macular rash with excoriations. Concurrent diffuse urticaria on arms, legs and torso.   Musculoskeletal: No pedal edema  Neurologic: no focal neurologic deficits appreciated

## 2024-07-12 NOTE — PROGRESS NOTES
[facility] Dermatology Note    Encounter Date: Jul 15, 2024    Dermatology Problem List:  1. Chronic urticaria and angioedema  - fexofenadine 180-360 mg BID, hydroxyzine 25 mg at bedtime, EpiPen, prednisone taper, albuterol    ______________________________________    Impression/Plan:  1. Chronic urticaria and angioedema  - widespread urticaria with angioedema involving the lips, airway  - no clear triggers  - Start albuterol 108 (90 Base) MCG/ACT inhaler, inhale 2 puffs into the lungs every 6 hours as needed for shortness of breath, wheezing or cough  - Start EPINEPHrine 0.3 MG/0.3ML injection 2-pack, inject 0.3 mLs (0.3 mg) into the muscle as needed for anaphylaxis, may repeat one time in 5-15 minutes if response to initial dose is inadequate. Discussed importance of going to the ER after use of this medication.  - Start fexofenadine 180 MG tablet, take 1 tablet (180 mg) by mouth every 12 hours. Can increase to 360 mg BID if refractory  - Start hydrOXYzine HCl 25 MG tablet, take 1 tablet (25 mg) by mouth at bedtime  - Start predniSONE 10 MG tablet, 40mg x5 days, 30mg x5 days, 20mg x5 days, 10mg x5 days, 5mg x5 days  - Referral to allergy/asthma for further investigation of potential triggers  - Anti Nuclear Amy IgG by IFA with Reflex, CBC with platelets differential, Complement C3, Complement C4, CMP, Ferritin, Protein electrophoresis, Protein Immunofixation Serum, TSH with free T4 reflex  - Discussed limited Ibuprofen use and alcohol consumption.    Follow-up in 1 month.     Staff Involved:  Staff/Scribe    Scribe Disclosure:   I, Kinjal Staples, am serving as a scribe to document services personally performed by Antonio Turcios MD based on data collection and the provider's statements to me.     Provider Disclosure:   The documentation recorded by the scribe accurately reflects the services I personally performed and the decisions made by me.    Antonio Turcios MD   of  Dermatology  Department of Dermatology  [facility]      CC:   Chief Complaint   Patient presents with    Rash     Rash; ongoing whole body rash since february, with new widespread urticaria and lip swelling.  Patient states the rash itches.  Patient is having an itchy swollen throat, coughing and a difficult time breathing.        History of Present Illness:  Mr. Kathy Casiano is a 38 year old male who presents as a new patient for a whole body rash that onset in February, with new widespread urticaria and lip swelling that onset on Tuesday. He reports that he is covered in hives and went to the ER that gave him Prednisone which helped except his hives would gradually come back at night. He states that it is getting worse every day and has an itchy swollen throat, coughing, and a difficulty time breathing. He states that he has never had this happen to him before prior to February. He states that he he wakes up with lip swelling and has had this since being on Prednisone. He denies new exposures.    Labs:  N/A    Physical exam:  Vitals: There were no vitals taken for this visit.  GEN: This is a well developed, well-nourished male in no acute distress, in a pleasant mood.    SKIN: Brand phototype III  - Total skin excluding the undergarment areas was performed. The exam included the head/face, neck, both arms, chest, back, abdomen, both legs, digits and/or nails.   - widespread erythematous urticarial papules and plaques covering >90% BSA  - swelling of the upper lip  - no swelling of the eyelids  - No other lesions of concern on areas examined.     Past Medical History:   No past medical history on file.  No past surgical history on file.    Social History:   reports that he has quit smoking. His smoking use included cigarettes. He has a 2.5 pack-year smoking history. He has never been exposed to tobacco smoke. He has never used smokeless tobacco. He reports current alcohol use. He  reports that he does not use drugs.    Family History:  Family History   Problem Relation Age of Onset    Hypertension Mother     Hyperlipidemia Mother     Cerebrovascular Disease Mother        Medications:  Current Outpatient Medications   Medication Sig Dispense Refill    albuterol (PROAIR HFA/PROVENTIL HFA/VENTOLIN HFA) 108 (90 Base) MCG/ACT inhaler Inhale 2 puffs into the lungs every 6 hours as needed for shortness of breath, wheezing or cough 18 g 11    EPINEPHrine (ANY BX GENERIC EQUIV) 0.3 MG/0.3ML injection 2-pack Inject 0.3 mg into the muscle as needed      EPINEPHrine (ANY BX GENERIC EQUIV) 0.3 MG/0.3ML injection 2-pack Inject 0.3 mLs (0.3 mg) into the muscle as needed for anaphylaxis May repeat one time in 5-15 minutes if response to initial dose is inadequate. 2 each 11    fexofenadine (ALLEGRA) 180 MG tablet Take 1 tablet (180 mg) by mouth every 12 hours 180 tablet 3    hydrOXYzine HCl (ATARAX) 25 MG tablet Take 1 tablet (25 mg) by mouth at bedtime 90 tablet 3    ibuprofen (ADVIL/MOTRIN) 400 MG tablet Take 1 tablet (400 mg) by mouth every 12 hours as needed for moderate pain 60 tablet 0    Omega-3 Fish Oil 500 MG capsule Take 1 capsule (500 mg) by mouth daily 90 capsule 0    predniSONE (DELTASONE) 10 MG tablet 40mg x5 days, 30mg x5 days, 20mg x5 days, 10mg x5 days, 5mg x5 days 55 tablet 1    Calamine external lotion Apply twice daily (Patient not taking: Reported on 7/15/2024) 90 mL 0    cyclobenzaprine (FLEXERIL) 10 MG tablet Take 1 tablet (10 mg) by mouth nightly as needed for muscle spasms (Patient not taking: Reported on 7/15/2024) 20 tablet 0    triamcinolone (KENALOG) 0.1 % external cream Apply topically 2 times daily Apply twice daily for 5 days (Patient not taking: Reported on 7/15/2024) 30 g 0     Allergies   Allergen Reactions    Nickel

## 2024-07-15 ENCOUNTER — OFFICE VISIT (OUTPATIENT)
Dept: DERMATOLOGY | Facility: CLINIC | Age: 39
End: 2024-07-15
Payer: COMMERCIAL

## 2024-07-15 DIAGNOSIS — L50.8 CHRONIC URTICARIA: Primary | ICD-10-CM

## 2024-07-15 DIAGNOSIS — T78.3XXA ANGIOEDEMA, INITIAL ENCOUNTER: ICD-10-CM

## 2024-07-15 LAB
ALBUMIN SERPL BCG-MCNC: 4.3 G/DL (ref 3.5–5.2)
ALP SERPL-CCNC: 66 U/L (ref 40–150)
ALT SERPL W P-5'-P-CCNC: 33 U/L (ref 0–70)
ANION GAP SERPL CALCULATED.3IONS-SCNC: 12 MMOL/L (ref 7–15)
AST SERPL W P-5'-P-CCNC: 20 U/L (ref 0–45)
BASOPHILS # BLD AUTO: 0 10E3/UL (ref 0–0.2)
BASOPHILS NFR BLD AUTO: 0 %
BILIRUB SERPL-MCNC: 0.4 MG/DL
BUN SERPL-MCNC: 24.1 MG/DL (ref 6–20)
CALCIUM SERPL-MCNC: 8.9 MG/DL (ref 8.6–10)
CHLORIDE SERPL-SCNC: 106 MMOL/L (ref 98–107)
CREAT SERPL-MCNC: 0.85 MG/DL (ref 0.67–1.17)
DEPRECATED HCO3 PLAS-SCNC: 22 MMOL/L (ref 22–29)
EGFRCR SERPLBLD CKD-EPI 2021: >90 ML/MIN/1.73M2
EOSINOPHIL # BLD AUTO: 0.1 10E3/UL (ref 0–0.7)
EOSINOPHIL NFR BLD AUTO: 0 %
ERYTHROCYTE [DISTWIDTH] IN BLOOD BY AUTOMATED COUNT: 12.8 % (ref 10–15)
FERRITIN SERPL-MCNC: 315 NG/ML (ref 31–409)
GLUCOSE SERPL-MCNC: 108 MG/DL (ref 70–99)
HCT VFR BLD AUTO: 45 % (ref 40–53)
HGB BLD-MCNC: 15.2 G/DL (ref 13.3–17.7)
IMM GRANULOCYTES # BLD: 0.3 10E3/UL
IMM GRANULOCYTES NFR BLD: 2 %
LYMPHOCYTES # BLD AUTO: 4.2 10E3/UL (ref 0.8–5.3)
LYMPHOCYTES NFR BLD AUTO: 31 %
MCH RBC QN AUTO: 28.5 PG (ref 26.5–33)
MCHC RBC AUTO-ENTMCNC: 33.8 G/DL (ref 31.5–36.5)
MCV RBC AUTO: 84 FL (ref 78–100)
MONOCYTES # BLD AUTO: 0.7 10E3/UL (ref 0–1.3)
MONOCYTES NFR BLD AUTO: 5 %
NEUTROPHILS # BLD AUTO: 8.5 10E3/UL (ref 1.6–8.3)
NEUTROPHILS NFR BLD AUTO: 62 %
NRBC # BLD AUTO: 0 10E3/UL
NRBC BLD AUTO-RTO: 0 /100
PLATELET # BLD AUTO: 309 10E3/UL (ref 150–450)
POTASSIUM SERPL-SCNC: 3.8 MMOL/L (ref 3.4–5.3)
PROT SERPL-MCNC: 7.2 G/DL (ref 6.4–8.3)
RBC # BLD AUTO: 5.33 10E6/UL (ref 4.4–5.9)
SODIUM SERPL-SCNC: 140 MMOL/L (ref 135–145)
T4 FREE SERPL-MCNC: 1.35 NG/DL (ref 0.9–1.7)
TOTAL PROTEIN SERUM FOR ELP: 7.1 G/DL (ref 6.4–8.3)
TSH SERPL DL<=0.005 MIU/L-ACNC: 5.96 UIU/ML (ref 0.3–4.2)
WBC # BLD AUTO: 13.9 10E3/UL (ref 4–11)

## 2024-07-15 PROCEDURE — 84155 ASSAY OF PROTEIN SERUM: CPT | Mod: XU | Performed by: DERMATOLOGY

## 2024-07-15 PROCEDURE — 84439 ASSAY OF FREE THYROXINE: CPT | Performed by: DERMATOLOGY

## 2024-07-15 PROCEDURE — 84165 PROTEIN E-PHORESIS SERUM: CPT | Performed by: PATHOLOGY

## 2024-07-15 PROCEDURE — 86038 ANTINUCLEAR ANTIBODIES: CPT | Performed by: DERMATOLOGY

## 2024-07-15 PROCEDURE — 82728 ASSAY OF FERRITIN: CPT | Performed by: DERMATOLOGY

## 2024-07-15 PROCEDURE — 86160 COMPLEMENT ANTIGEN: CPT | Performed by: DERMATOLOGY

## 2024-07-15 PROCEDURE — 99203 OFFICE O/P NEW LOW 30 MIN: CPT | Performed by: DERMATOLOGY

## 2024-07-15 PROCEDURE — 80053 COMPREHEN METABOLIC PANEL: CPT | Performed by: DERMATOLOGY

## 2024-07-15 PROCEDURE — 86334 IMMUNOFIX E-PHORESIS SERUM: CPT | Performed by: PATHOLOGY

## 2024-07-15 PROCEDURE — 85025 COMPLETE CBC W/AUTO DIFF WBC: CPT | Performed by: DERMATOLOGY

## 2024-07-15 PROCEDURE — 84443 ASSAY THYROID STIM HORMONE: CPT | Performed by: DERMATOLOGY

## 2024-07-15 PROCEDURE — 36415 COLL VENOUS BLD VENIPUNCTURE: CPT | Performed by: DERMATOLOGY

## 2024-07-15 RX ORDER — FEXOFENADINE HCL 180 MG/1
180 TABLET ORAL EVERY 12 HOURS
Qty: 180 TABLET | Refills: 3 | Status: SHIPPED | OUTPATIENT
Start: 2024-07-15 | End: 2024-08-19

## 2024-07-15 RX ORDER — EPINEPHRINE 0.3 MG/.3ML
0.3 INJECTION SUBCUTANEOUS PRN
Qty: 2 EACH | Refills: 11 | Status: SHIPPED | OUTPATIENT
Start: 2024-07-15

## 2024-07-15 RX ORDER — EPINEPHRINE 0.3 MG/.3ML
0.3 INJECTION SUBCUTANEOUS PRN
COMMUNITY
Start: 2024-07-10

## 2024-07-15 RX ORDER — PREDNISONE 10 MG/1
TABLET ORAL
Qty: 55 TABLET | Refills: 1 | Status: SHIPPED | OUTPATIENT
Start: 2024-07-15

## 2024-07-15 RX ORDER — ALBUTEROL SULFATE 90 UG/1
2 AEROSOL, METERED RESPIRATORY (INHALATION) EVERY 6 HOURS PRN
Qty: 18 G | Refills: 11 | Status: SHIPPED | OUTPATIENT
Start: 2024-07-15

## 2024-07-15 RX ORDER — HYDROXYZINE HYDROCHLORIDE 25 MG/1
25 TABLET, FILM COATED ORAL AT BEDTIME
Qty: 90 TABLET | Refills: 3 | Status: SHIPPED | OUTPATIENT
Start: 2024-07-15 | End: 2024-08-19 | Stop reason: ALTCHOICE

## 2024-07-15 ASSESSMENT — PAIN SCALES - GENERAL: PAINLEVEL: EXTREME PAIN (8)

## 2024-07-15 NOTE — NURSING NOTE
Kathy Casiano's goals for this visit include:   Chief Complaint   Patient presents with    Rash     Rash; ongoing whole body rash since february, with new widespread urticaria and lip swelling.  Patient states the rash itches.  Patient is having an itchy swollen throat, coughing and a difficult time breathing.        He requests these members of his care team be copied on today's visit information:     PCP: Walker Mao    Referring Provider:  Referred Self, MD  No address on file    There were no vitals taken for this visit.    Do you need any medication refills at today's visit?     Miladis Mcginnis on 7/15/2024 at 7:57 AM

## 2024-07-15 NOTE — LETTER
July 15, 2024      To whom it may concern:    This is to certify that Kathy Casiano was seen in our Dermatology office on 7/15/2024. Please excuse his absence from work today. Please note that additional protected health information will not be released without the patient's express written permission.    Sincerely,  Antonio Turcios MD, FAAD   of Dermatology  Department of Dermatology  [facility]

## 2024-07-15 NOTE — LETTER
7/15/2024      Kathy Casiano  [address]  [place] [address]      Dear Colleague,    Thank you for referring your patient, Kathy Casiano, to the [facility]. Please see a copy of my visit note below.    [facility] Dermatology Note    Encounter Date: Jul 15, 2024    Dermatology Problem List:  1. Chronic urticaria and angioedema  - fexofenadine 180-360 mg BID, hydroxyzine 25 mg at bedtime, EpiPen, prednisone taper, albuterol    ______________________________________    Impression/Plan:  1. Chronic urticaria and angioedema  - widespread urticaria with angioedema involving the lips, airway  - no clear triggers  - Start albuterol 108 (90 Base) MCG/ACT inhaler, inhale 2 puffs into the lungs every 6 hours as needed for shortness of breath, wheezing or cough  - Start EPINEPHrine 0.3 MG/0.3ML injection 2-pack, inject 0.3 mLs (0.3 mg) into the muscle as needed for anaphylaxis, may repeat one time in 5-15 minutes if response to initial dose is inadequate. Discussed importance of going to the ER after use of this medication.  - Start fexofenadine 180 MG tablet, take 1 tablet (180 mg) by mouth every 12 hours. Can increase to 360 mg BID if refractory  - Start hydrOXYzine HCl 25 MG tablet, take 1 tablet (25 mg) by mouth at bedtime  - Start predniSONE 10 MG tablet, 40mg x5 days, 30mg x5 days, 20mg x5 days, 10mg x5 days, 5mg x5 days  - Referral to allergy/asthma for further investigation of potential triggers  - Anti Nuclear Amy IgG by IFA with Reflex, CBC with platelets differential, Complement C3, Complement C4, CMP, Ferritin, Protein electrophoresis, Protein Immunofixation Serum, TSH with free T4 reflex  - Discussed limited Ibuprofen use and alcohol consumption.    Follow-up in 1 month.     Staff Involved:  Staff/Scribe    Scribe Disclosure:   GREGORIO, Kinjal Staples, am serving as a scribe to document services personally performed by Antonio Turcios MD based on data collection and the  provider's statements to me.     Provider Disclosure:   The documentation recorded by the scribe accurately reflects the services I personally performed and the decisions made by me.    Antonio Turcios MD   of Dermatology  Department of Dermatology  [facility]      CC:   Chief Complaint   Patient presents with     Rash     Rash; ongoing whole body rash since february, with new widespread urticaria and lip swelling.  Patient states the rash itches.  Patient is having an itchy swollen throat, coughing and a difficult time breathing.        History of Present Illness:  Mr. Kathy Casiano is a 38 year old male who presents as a new patient for a whole body rash that onset in February, with new widespread urticaria and lip swelling that onset on Tuesday. He reports that he is covered in hives and went to the ER that gave him Prednisone which helped except his hives would gradually come back at night. He states that it is getting worse every day and has an itchy swollen throat, coughing, and a difficulty time breathing. He states that he has never had this happen to him before prior to February. He states that he he wakes up with lip swelling and has had this since being on Prednisone. He denies new exposures.    Labs:  N/A    Physical exam:  Vitals: There were no vitals taken for this visit.  GEN: This is a well developed, well-nourished male in no acute distress, in a pleasant mood.    SKIN: Brand phototype III  - Total skin excluding the undergarment areas was performed. The exam included the head/face, neck, both arms, chest, back, abdomen, both legs, digits and/or nails.   - widespread erythematous urticarial papules and plaques covering >90% BSA  - swelling of the upper lip  - no swelling of the eyelids  - No other lesions of concern on areas examined.     Past Medical History:   No past medical history on file.  No past surgical history on file.    Social  History:   reports that he has quit smoking. His smoking use included cigarettes. He has a 2.5 pack-year smoking history. He has never been exposed to tobacco smoke. He has never used smokeless tobacco. He reports current alcohol use. He reports that he does not use drugs.    Family History:  Family History   Problem Relation Age of Onset     Hypertension Mother      Hyperlipidemia Mother      Cerebrovascular Disease Mother        Medications:  Current Outpatient Medications   Medication Sig Dispense Refill     albuterol (PROAIR HFA/PROVENTIL HFA/VENTOLIN HFA) 108 (90 Base) MCG/ACT inhaler Inhale 2 puffs into the lungs every 6 hours as needed for shortness of breath, wheezing or cough 18 g 11     EPINEPHrine (ANY BX GENERIC EQUIV) 0.3 MG/0.3ML injection 2-pack Inject 0.3 mg into the muscle as needed       EPINEPHrine (ANY BX GENERIC EQUIV) 0.3 MG/0.3ML injection 2-pack Inject 0.3 mLs (0.3 mg) into the muscle as needed for anaphylaxis May repeat one time in 5-15 minutes if response to initial dose is inadequate. 2 each 11     fexofenadine (ALLEGRA) 180 MG tablet Take 1 tablet (180 mg) by mouth every 12 hours 180 tablet 3     hydrOXYzine HCl (ATARAX) 25 MG tablet Take 1 tablet (25 mg) by mouth at bedtime 90 tablet 3     ibuprofen (ADVIL/MOTRIN) 400 MG tablet Take 1 tablet (400 mg) by mouth every 12 hours as needed for moderate pain 60 tablet 0     Omega-3 Fish Oil 500 MG capsule Take 1 capsule (500 mg) by mouth daily 90 capsule 0     predniSONE (DELTASONE) 10 MG tablet 40mg x5 days, 30mg x5 days, 20mg x5 days, 10mg x5 days, 5mg x5 days 55 tablet 1     Calamine external lotion Apply twice daily (Patient not taking: Reported on 7/15/2024) 90 mL 0     cyclobenzaprine (FLEXERIL) 10 MG tablet Take 1 tablet (10 mg) by mouth nightly as needed for muscle spasms (Patient not taking: Reported on 7/15/2024) 20 tablet 0     triamcinolone (KENALOG) 0.1 % external cream Apply topically 2 times daily Apply twice daily for 5 days  (Patient not taking: Reported on 7/15/2024) 30 g 0     Allergies   Allergen Reactions     Nickel    Again, thank you for allowing me to participate in the care of your patient.        Sincerely,        Antonio Turcios MD

## 2024-07-16 LAB
ANA SER QL IF: NEGATIVE
C3 SERPL-MCNC: 136 MG/DL (ref 81–157)
C4 SERPL-MCNC: 28 MG/DL (ref 13–39)

## 2024-07-17 LAB
ALBUMIN SERPL ELPH-MCNC: 4.4 G/DL (ref 3.7–5.1)
ALPHA1 GLOB SERPL ELPH-MCNC: 0.3 G/DL (ref 0.2–0.4)
ALPHA2 GLOB SERPL ELPH-MCNC: 0.6 G/DL (ref 0.5–0.9)
B-GLOBULIN SERPL ELPH-MCNC: 0.7 G/DL (ref 0.6–1)
GAMMA GLOB SERPL ELPH-MCNC: 1.1 G/DL (ref 0.7–1.6)
M PROTEIN SERPL ELPH-MCNC: 0 G/DL
PROT PATTERN SERPL ELPH-IMP: NORMAL
PROT PATTERN SERPL IFE-IMP: NORMAL

## 2024-08-19 ENCOUNTER — OFFICE VISIT (OUTPATIENT)
Dept: DERMATOLOGY | Facility: CLINIC | Age: 39
End: 2024-08-19
Payer: COMMERCIAL

## 2024-08-19 DIAGNOSIS — T78.3XXA ANGIOEDEMA, INITIAL ENCOUNTER: ICD-10-CM

## 2024-08-19 DIAGNOSIS — L50.8 CHRONIC URTICARIA: Primary | ICD-10-CM

## 2024-08-19 PROCEDURE — 99213 OFFICE O/P EST LOW 20 MIN: CPT | Performed by: DERMATOLOGY

## 2024-08-19 RX ORDER — FEXOFENADINE HCL 180 MG/1
360 TABLET ORAL EVERY 12 HOURS
Qty: 360 TABLET | Refills: 3 | Status: SHIPPED | OUTPATIENT
Start: 2024-08-19

## 2024-08-19 NOTE — PROGRESS NOTES
[facility] Dermatology Note    Encounter Date: Aug 19, 2024    Dermatology Problem List:  1. Chronic urticaria and angioedema  - fexofenadine 360 mg BID, EpiPen, albuterol  - previous tx: hydroxyzine 25 mg at bedtime, prednisone taper  ______________________________________    Impression/Plan:  1. Chronic urticaria, improved with fexofenadine 180 mg BID, but continues to have ongoing daily urticaria. Angioedema and pulmonary symptoms have resolved. Recent TSH slightly elevated (with normal free T4), so will plan to recheck at follow up in 3-4 months.   - Discussed risks benefits of increasing dosage of fexofenadine vs Xolair  - Patient will increase to fexofenadine 360 mg BID  - Stop hydroxyzine     Follow-up in 3 months.       Staff Involved:  Staff and Scribe    I, Pepper Shah, am serving as a scribe; to document services personally performed by Antonio Turcios MD -based on data collection and the provider's statements to me.    Provider Disclosure:   The documentation recorded by the scribe accurately reflects the services I personally performed and the decisions made by me.    Antonio Turcios MD   of Dermatology  Department of Dermatology  [facility]      CC:   Chief Complaint   Patient presents with    Derm Problem     It has calmed down but it is still there. In the morning the itching is still there but the meds do help. Can still see the red spots but it isn't bumpy.        History of Present Illness:  Mr. Kathy Casiano is a 38 year old male who presents as a return patient here for chronic urticaria. This rash started around March or April, then became hives. Patient reports it has calmed down and is doing much better. In the morning, there is still some itching, but the medication helps. Patient reports he can see red spots, but it isn't bumpy. Patient is taking Allegra twice a day. Patient reports the topicals didn't do much for him.  His breathing is okay now.     Patient is otherwise feeling well, with no additional skin concerns.     Labs:  7/2024 CBC, CMP, TSH, Free T4, BENNY, SPEP, C3, C4 reviewed    Physical exam:  Vitals: There were no vitals taken for this visit.  GEN: This is a well developed, well-nourished male in no acute distress, in a pleasant mood.    SKIN: Brand phototype IV  - Focused examination of the face, neck, arms, hands was performed.  - Few erythematous urticarial papules and plaques on the arms.  - No other lesions of concern on areas examined.     Past Medical History:   No past medical history on file.  No past surgical history on file.    Social History:   reports that he has been smoking cigarettes. He has a 2.5 pack-year smoking history. He has never been exposed to tobacco smoke. He has never used smokeless tobacco. He reports current alcohol use. He reports that he does not use drugs.    Family History:  Family History   Problem Relation Age of Onset    Hypertension Mother     Hyperlipidemia Mother     Cerebrovascular Disease Mother        Medications:  Current Outpatient Medications   Medication Sig Dispense Refill    albuterol (PROAIR HFA/PROVENTIL HFA/VENTOLIN HFA) 108 (90 Base) MCG/ACT inhaler Inhale 2 puffs into the lungs every 6 hours as needed for shortness of breath, wheezing or cough 18 g 11    EPINEPHrine (ANY BX GENERIC EQUIV) 0.3 MG/0.3ML injection 2-pack Inject 0.3 mLs (0.3 mg) into the muscle as needed for anaphylaxis May repeat one time in 5-15 minutes if response to initial dose is inadequate. 2 each 11    fexofenadine (ALLEGRA) 180 MG tablet Take 1 tablet (180 mg) by mouth every 12 hours 180 tablet 3    Calamine external lotion Apply twice daily (Patient not taking: Reported on 7/15/2024) 90 mL 0    cyclobenzaprine (FLEXERIL) 10 MG tablet Take 1 tablet (10 mg) by mouth nightly as needed for muscle spasms (Patient not taking: Reported on 8/19/2024) 20 tablet 0    EPINEPHrine (ANY BX GENERIC  EQUIV) 0.3 MG/0.3ML injection 2-pack Inject 0.3 mg into the muscle as needed (Patient not taking: Reported on 8/19/2024)      hydrOXYzine HCl (ATARAX) 25 MG tablet Take 1 tablet (25 mg) by mouth at bedtime (Patient not taking: Reported on 8/19/2024) 90 tablet 3    ibuprofen (ADVIL/MOTRIN) 400 MG tablet Take 1 tablet (400 mg) by mouth every 12 hours as needed for moderate pain (Patient not taking: Reported on 8/19/2024) 60 tablet 0    Omega-3 Fish Oil 500 MG capsule Take 1 capsule (500 mg) by mouth daily (Patient not taking: Reported on 8/19/2024) 90 capsule 0    predniSONE (DELTASONE) 10 MG tablet 40mg x5 days, 30mg x5 days, 20mg x5 days, 10mg x5 days, 5mg x5 days (Patient not taking: Reported on 8/19/2024) 55 tablet 1    triamcinolone (KENALOG) 0.1 % external cream Apply topically 2 times daily Apply twice daily for 5 days (Patient not taking: Reported on 7/15/2024) 30 g 0     Allergies   Allergen Reactions    Nickel

## 2024-08-19 NOTE — NURSING NOTE
Kathy Casiano's goals for this visit include:   Chief Complaint   Patient presents with    Derm Problem     It has calmed down but it is still there. In the morning the itching is still there but the meds do help. Can still see the red spots but it isn't bumpy.        He requests these members of his care team be copied on today's visit information:     PCP: Walker Mao    Referring Provider:  Referred Self, MD  No address on file    There were no vitals taken for this visit.    Do you need any medication refills at today's visit?     Gwendolyn Sotelo LPN on 8/19/2024 at 7:39 AM

## 2024-08-19 NOTE — LETTER
8/19/2024      Kathy Casiano  [address]  [place] [address]      Dear Colleague,    Thank you for referring your patient, Kathy Casiano, to the [facility]. Please see a copy of my visit note below.    [facility] Dermatology Note    Encounter Date: Aug 19, 2024    Dermatology Problem List:  1. Chronic urticaria and angioedema  - fexofenadine 360 mg BID, EpiPen, albuterol  - previous tx: hydroxyzine 25 mg at bedtime, prednisone taper  ______________________________________    Impression/Plan:  1. Chronic urticaria, improved with fexofenadine 180 mg BID, but continues to have ongoing daily urticaria. Angioedema and pulmonary symptoms have resolved. Recent TSH slightly elevated (with normal free T4), so will plan to recheck at follow up in 3-4 months.   - Discussed risks benefits of increasing dosage of fexofenadine vs Xolair  - Patient will increase to fexofenadine 360 mg BID  - Stop hydroxyzine     Follow-up in 3 months.       Staff Involved:  Staff and Scribe    I, Pepper Shah, am serving as a scribe; to document services personally performed by Antonio Turcios MD -based on data collection and the provider's statements to me.    Provider Disclosure:   The documentation recorded by the scribe accurately reflects the services I personally performed and the decisions made by me.    Antonio Turcios MD   of Dermatology  Department of Dermatology  [facility]      CC:   Chief Complaint   Patient presents with     Derm Problem     It has calmed down but it is still there. In the morning the itching is still there but the meds do help. Can still see the red spots but it isn't bumpy.        History of Present Illness:  Mr. Kathy Casiano is a 38 year old male who presents as a return patient here for chronic urticaria. This rash started around March or April, then became hives. Patient reports it has calmed down and is doing  much better. In the morning, there is still some itching, but the medication helps. Patient reports he can see red spots, but it isn't bumpy. Patient is taking Allegra twice a day. Patient reports the topicals didn't do much for him. His breathing is okay now.     Patient is otherwise feeling well, with no additional skin concerns.     Labs:  7/2024 CBC, CMP, TSH, Free T4, BENNY, SPEP, C3, C4 reviewed    Physical exam:  Vitals: There were no vitals taken for this visit.  GEN: This is a well developed, well-nourished male in no acute distress, in a pleasant mood.    SKIN: Brand phototype IV  - Focused examination of the face, neck, arms, hands was performed.  - Few erythematous urticarial papules and plaques on the arms.  - No other lesions of concern on areas examined.     Past Medical History:   No past medical history on file.  No past surgical history on file.    Social History:   reports that he has been smoking cigarettes. He has a 2.5 pack-year smoking history. He has never been exposed to tobacco smoke. He has never used smokeless tobacco. He reports current alcohol use. He reports that he does not use drugs.    Family History:  Family History   Problem Relation Age of Onset     Hypertension Mother      Hyperlipidemia Mother      Cerebrovascular Disease Mother        Medications:  Current Outpatient Medications   Medication Sig Dispense Refill     albuterol (PROAIR HFA/PROVENTIL HFA/VENTOLIN HFA) 108 (90 Base) MCG/ACT inhaler Inhale 2 puffs into the lungs every 6 hours as needed for shortness of breath, wheezing or cough 18 g 11     EPINEPHrine (ANY BX GENERIC EQUIV) 0.3 MG/0.3ML injection 2-pack Inject 0.3 mLs (0.3 mg) into the muscle as needed for anaphylaxis May repeat one time in 5-15 minutes if response to initial dose is inadequate. 2 each 11     fexofenadine (ALLEGRA) 180 MG tablet Take 1 tablet (180 mg) by mouth every 12 hours 180 tablet 3     Calamine external lotion Apply twice daily (Patient  not taking: Reported on 7/15/2024) 90 mL 0     cyclobenzaprine (FLEXERIL) 10 MG tablet Take 1 tablet (10 mg) by mouth nightly as needed for muscle spasms (Patient not taking: Reported on 8/19/2024) 20 tablet 0     EPINEPHrine (ANY BX GENERIC EQUIV) 0.3 MG/0.3ML injection 2-pack Inject 0.3 mg into the muscle as needed (Patient not taking: Reported on 8/19/2024)       hydrOXYzine HCl (ATARAX) 25 MG tablet Take 1 tablet (25 mg) by mouth at bedtime (Patient not taking: Reported on 8/19/2024) 90 tablet 3     ibuprofen (ADVIL/MOTRIN) 400 MG tablet Take 1 tablet (400 mg) by mouth every 12 hours as needed for moderate pain (Patient not taking: Reported on 8/19/2024) 60 tablet 0     Omega-3 Fish Oil 500 MG capsule Take 1 capsule (500 mg) by mouth daily (Patient not taking: Reported on 8/19/2024) 90 capsule 0     predniSONE (DELTASONE) 10 MG tablet 40mg x5 days, 30mg x5 days, 20mg x5 days, 10mg x5 days, 5mg x5 days (Patient not taking: Reported on 8/19/2024) 55 tablet 1     triamcinolone (KENALOG) 0.1 % external cream Apply topically 2 times daily Apply twice daily for 5 days (Patient not taking: Reported on 7/15/2024) 30 g 0     Allergies   Allergen Reactions     Nickel                  Again, thank you for allowing me to participate in the care of your patient.        Sincerely,        Antonio Turcios MD

## 2024-11-25 ENCOUNTER — OFFICE VISIT (OUTPATIENT)
Dept: DERMATOLOGY | Facility: CLINIC | Age: 39
End: 2024-11-25
Payer: COMMERCIAL

## 2024-11-25 DIAGNOSIS — L50.8 CHRONIC URTICARIA: Primary | ICD-10-CM

## 2024-11-25 ASSESSMENT — PAIN SCALES - GENERAL: PAINLEVEL_OUTOF10: NO PAIN (0)

## 2024-11-25 NOTE — PROGRESS NOTES
[facility] Dermatology Note    Encounter Date: Nov 25, 2024    Dermatology Problem List:  1. Chronic urticaria and angioedema  - fexofenadine 360 mg BID, EpiPen, albuterol  - previous tx: hydroxyzine 25 mg at bedtime, prednisone taper  - in reserve: omalizumab    ______________________________________    Impression/Plan:  1. Chronic urticaria, with recent acute flare yesterday(11/24/24)  - discussed the risks and benefits of starting an injectable medication Xolair vs upping the fexofenadine from 3 times daily to 4 times a day. Pt opted to defer the Xolair medication for now and would like to up the dosage of fexofenadine to 4 times a day given that his rash was stable prior to this flare.   - discussed using hydroxyzine 25 mg at bedtime if needed.       Follow-up in 3-4 months.       Staff Involved:  Staff and Scribe    Scribe Disclosure:   I, NAHEED JUSTIN, am serving as a scribe; to document services personally performed by Antonio Turcios MD -based on data collection and the provider's statements to me.     Provider Disclosure:   The documentation recorded by the scribe accurately reflects the services I personally performed and the decisions made by me.    Antonio Turcios MD   of Dermatology  Department of Dermatology  [facility]        CC:   Chief Complaint   Patient presents with    Derm Problem     Pt here for Chronic urticaria. Hives have returned. Pt stopped using predisone, but last night had to take one, otherwise he normally used OTC allergy medications. Pt unsure what caused the flare.       History of Present Illness:  Mr. Kathy Casiano is a 38 year old male who presents as a return patient. Reports that the rash came back on yesterday(11/24/24) all over his body. Adds that his diet was stable. States that it feels like its improving slightly but is still present. Only takes prednisone as needed. Takes fexofenadine 3 times a day,  with minimal improvement. Pt reports that things were pretty stable prior to this flare-up.          Labs:  N/A    Physical exam:  Vitals: There were no vitals taken for this visit.  GEN: This is a well developed, well-nourished male in no acute distress, in a pleasant mood.    SKIN: Brand phototype IV  - Focused examination of the face, neck, arms, and hands was performed.  - numerous urticarial papules and plaques on the arms and hands.   - No other lesions of concern on areas examined.     Past Medical History:   No past medical history on file.  No past surgical history on file.    Social History:   reports that he has been smoking cigarettes. He has a 2.5 pack-year smoking history. He has never been exposed to tobacco smoke. He has never used smokeless tobacco. He reports current alcohol use. He reports that he does not use drugs.    Family History:  Family History   Problem Relation Age of Onset    Hypertension Mother     Hyperlipidemia Mother     Cerebrovascular Disease Mother        Medications:  Current Outpatient Medications   Medication Sig Dispense Refill    albuterol (PROAIR HFA/PROVENTIL HFA/VENTOLIN HFA) 108 (90 Base) MCG/ACT inhaler Inhale 2 puffs into the lungs every 6 hours as needed for shortness of breath, wheezing or cough 18 g 11    EPINEPHrine (ANY BX GENERIC EQUIV) 0.3 MG/0.3ML injection 2-pack Inject 0.3 mg into the muscle as needed.      EPINEPHrine (ANY BX GENERIC EQUIV) 0.3 MG/0.3ML injection 2-pack Inject 0.3 mLs (0.3 mg) into the muscle as needed for anaphylaxis May repeat one time in 5-15 minutes if response to initial dose is inadequate. 2 each 11    fexofenadine (ALLEGRA) 180 MG tablet Take 2 tablets (360 mg) by mouth every 12 hours 360 tablet 3    ibuprofen (ADVIL/MOTRIN) 400 MG tablet Take 1 tablet (400 mg) by mouth every 12 hours as needed for moderate pain 60 tablet 0    Calamine external lotion Apply twice daily (Patient not taking: Reported on 11/25/2024) 90 mL 0     cyclobenzaprine (FLEXERIL) 10 MG tablet Take 1 tablet (10 mg) by mouth nightly as needed for muscle spasms (Patient not taking: Reported on 11/25/2024) 20 tablet 0    Omega-3 Fish Oil 500 MG capsule Take 1 capsule (500 mg) by mouth daily (Patient not taking: Reported on 11/25/2024) 90 capsule 0    predniSONE (DELTASONE) 10 MG tablet 40mg x5 days, 30mg x5 days, 20mg x5 days, 10mg x5 days, 5mg x5 days (Patient not taking: Reported on 11/25/2024) 55 tablet 1    triamcinolone (KENALOG) 0.1 % external cream Apply topically 2 times daily Apply twice daily for 5 days (Patient not taking: Reported on 11/25/2024) 30 g 0     Allergies   Allergen Reactions    Nickel      Pt states he is not allergic to Nickel

## 2024-11-25 NOTE — LETTER
11/25/2024      Kathy Casiano  [address]  [place] [address]      Dear Colleague,    Thank you for referring your patient, Kathy Casiano, to the [facility]. Please see a copy of my visit note below.    [facility] Dermatology Note    Encounter Date: Nov 25, 2024    Dermatology Problem List:  1. Chronic urticaria and angioedema  - fexofenadine 360 mg BID, EpiPen, albuterol  - previous tx: hydroxyzine 25 mg at bedtime, prednisone taper  - in reserve: omalizumab    ______________________________________    Impression/Plan:  1. Chronic urticaria, with recent acute flare yesterday(11/24/24)  - discussed the risks and benefits of starting an injectable medication Xolair vs upping the fexofenadine from 3 times daily to 4 times a day. Pt opted to defer the Xolair medication for now and would like to up the dosage of fexofenadine to 4 times a day given that his rash was stable prior to this flare.   - discussed using hydroxyzine 25 mg at bedtime if needed.       Follow-up in 3-4 months.       Staff Involved:  Staff and Scribe    Scribe Disclosure:   I, NAHEED JUSTIN, am serving as a scribe; to document services personally performed by Antonio Turcios MD -based on data collection and the provider's statements to me.     Provider Disclosure:   The documentation recorded by the scribe accurately reflects the services I personally performed and the decisions made by me.    Antonio Turcios MD   of Dermatology  Department of Dermatology  [facility]        CC:   Chief Complaint   Patient presents with     Derm Problem     Pt here for Chronic urticaria. Hives have returned. Pt stopped using predisone, but last night had to take one, otherwise he normally used OTC allergy medications. Pt unsure what caused the flare.       History of Present Illness:  Mr. Kathy Casiano is a 38 year old male who presents as a return patient. Reports that  the rash came back on yesterday(11/24/24) all over his body. Adds that his diet was stable. States that it feels like its improving slightly but is still present. Only takes prednisone as needed. Takes fexofenadine 3 times a day, with minimal improvement. Pt reports that things were pretty stable prior to this flare-up.          Labs:  N/A    Physical exam:  Vitals: There were no vitals taken for this visit.  GEN: This is a well developed, well-nourished male in no acute distress, in a pleasant mood.    SKIN: Brand phototype IV  - Focused examination of the face, neck, arms, and hands was performed.  - numerous urticarial papules and plaques on the arms and hands.   - No other lesions of concern on areas examined.     Past Medical History:   No past medical history on file.  No past surgical history on file.    Social History:   reports that he has been smoking cigarettes. He has a 2.5 pack-year smoking history. He has never been exposed to tobacco smoke. He has never used smokeless tobacco. He reports current alcohol use. He reports that he does not use drugs.    Family History:  Family History   Problem Relation Age of Onset     Hypertension Mother      Hyperlipidemia Mother      Cerebrovascular Disease Mother        Medications:  Current Outpatient Medications   Medication Sig Dispense Refill     albuterol (PROAIR HFA/PROVENTIL HFA/VENTOLIN HFA) 108 (90 Base) MCG/ACT inhaler Inhale 2 puffs into the lungs every 6 hours as needed for shortness of breath, wheezing or cough 18 g 11     EPINEPHrine (ANY BX GENERIC EQUIV) 0.3 MG/0.3ML injection 2-pack Inject 0.3 mg into the muscle as needed.       EPINEPHrine (ANY BX GENERIC EQUIV) 0.3 MG/0.3ML injection 2-pack Inject 0.3 mLs (0.3 mg) into the muscle as needed for anaphylaxis May repeat one time in 5-15 minutes if response to initial dose is inadequate. 2 each 11     fexofenadine (ALLEGRA) 180 MG tablet Take 2 tablets (360 mg) by mouth every 12 hours 360 tablet 3      ibuprofen (ADVIL/MOTRIN) 400 MG tablet Take 1 tablet (400 mg) by mouth every 12 hours as needed for moderate pain 60 tablet 0     Calamine external lotion Apply twice daily (Patient not taking: Reported on 11/25/2024) 90 mL 0     cyclobenzaprine (FLEXERIL) 10 MG tablet Take 1 tablet (10 mg) by mouth nightly as needed for muscle spasms (Patient not taking: Reported on 11/25/2024) 20 tablet 0     Omega-3 Fish Oil 500 MG capsule Take 1 capsule (500 mg) by mouth daily (Patient not taking: Reported on 11/25/2024) 90 capsule 0     predniSONE (DELTASONE) 10 MG tablet 40mg x5 days, 30mg x5 days, 20mg x5 days, 10mg x5 days, 5mg x5 days (Patient not taking: Reported on 11/25/2024) 55 tablet 1     triamcinolone (KENALOG) 0.1 % external cream Apply topically 2 times daily Apply twice daily for 5 days (Patient not taking: Reported on 11/25/2024) 30 g 0     Allergies   Allergen Reactions     Nickel      Pt states he is not allergic to Nickel         Again, thank you for allowing me to participate in the care of your patient.        Sincerely,        Antonio Turcios MD

## 2024-11-25 NOTE — NURSING NOTE
Kathy Casiano's goals for this visit include:   Chief Complaint   Patient presents with    Derm Problem     Pt here for Chronic urticaria. Hives have returned. Pt stopped using predisone, but last night had to take one, otherwise he normally used OTC allergy medications. Pt unsure what caused the flare.       He requests these members of his care team be copied on today's visit information:     PCP: Walker Mao    Referring Provider:  Referred Self, MD  No address on file    There were no vitals taken for this visit.    Do you need any medication refills at today's visit?     Shivani Tanner on 11/25/2024 at 7:45 AM

## 2024-12-31 ENCOUNTER — OFFICE VISIT (OUTPATIENT)
Dept: FAMILY MEDICINE | Facility: CLINIC | Age: 39
End: 2024-12-31
Payer: COMMERCIAL

## 2024-12-31 VITALS
SYSTOLIC BLOOD PRESSURE: 112 MMHG | WEIGHT: 217.8 LBS | HEIGHT: 65 IN | RESPIRATION RATE: 20 BRPM | TEMPERATURE: 98.2 F | OXYGEN SATURATION: 95 % | BODY MASS INDEX: 36.29 KG/M2 | DIASTOLIC BLOOD PRESSURE: 74 MMHG | HEART RATE: 82 BPM

## 2024-12-31 DIAGNOSIS — R68.89 FLU-LIKE SYMPTOMS: ICD-10-CM

## 2024-12-31 DIAGNOSIS — R05.1 ACUTE COUGH: ICD-10-CM

## 2024-12-31 DIAGNOSIS — Z20.828 EXPOSURE TO THE FLU: Primary | ICD-10-CM

## 2024-12-31 PROCEDURE — 99213 OFFICE O/P EST LOW 20 MIN: CPT | Performed by: FAMILY MEDICINE

## 2024-12-31 RX ORDER — BENZONATATE 200 MG/1
200 CAPSULE ORAL 3 TIMES DAILY PRN
Qty: 21 CAPSULE | Refills: 0 | Status: SHIPPED | OUTPATIENT
Start: 2024-12-31 | End: 2025-01-07

## 2024-12-31 RX ORDER — IBUPROFEN 600 MG/1
600 TABLET, FILM COATED ORAL EVERY 6 HOURS PRN
Qty: 360 TABLET | Refills: 0 | Status: SHIPPED | OUTPATIENT
Start: 2024-12-31

## 2024-12-31 NOTE — PROGRESS NOTES
Assessment & Plan     Exposure to the flu  Was exposed to his children, were tested positive for influenza A.    Symptoms been for over 3 days.  Advised patient supportive care, continue using albuterol inhaler as needed.  Increase fluid intake, continue with ibuprofen and Norco as needed.    Advised patient strongly to quit smoking.  In addition, advised patient to consider getting flu vaccine in the next week or so    Acute cough    - benzonatate (TESSALON) 200 MG capsule; Take 1 capsule (200 mg) by mouth 3 times daily as needed for cough.  - ibuprofen (ADVIL/MOTRIN) 600 MG tablet; Take 1 tablet (600 mg) by mouth every 6 hours as needed for moderate pain.    Flu-like symptoms    - ibuprofen (ADVIL/MOTRIN) 600 MG tablet; Take 1 tablet (600 mg) by mouth every 6 hours as needed for moderate pain.      Nicotine/Tobacco Cessation  He reports that he has been smoking cigarettes. He has a 2.5 pack-year smoking history. He has never been exposed to tobacco smoke. He has never used smokeless tobacco.  Nicotine/Tobacco Cessation Plan  Advised pt to quite, he is cutting down.      Work on weight loss  Regular exercise    Subjective   Kathy is a 39 year old, presenting for the following health issues:  URI    Patient reports for the past 3 days has been having congestion, fever, chills, headache.  Cough.  No wheezing, no short of breath  No diarrhea no vomiting.    Light smoker.  He reports his children were tested positive for influenza A      12/31/2024     8:28 AM   Additional Questions   Roomed by kayleen pena ma   Accompanied by self         12/31/2024     8:28 AM   Patient Reported Additional Medications   Patient reports taking the following new medications none     History of Present Illness       Reason for visit:  Fever, cough, congestion children has influenza a  Symptom onset:  3-7 days ago  Symptoms include:  Fever, night time chills, congestion, coughing  Symptom intensity:  Moderate  Symptom progression:   "Staying the same  Had these symptoms before:  No  What makes it worse:  No  What makes it better:  Ibuprofen, Tylenol and dayquil   He is taking medications regularly.         Acute Illness  Acute illness concerns:   Onset/Duration: 3 days  Symptoms:  Fever: Did not take temp but felt as if he had a fever  Chills/Sweats: YES- night time chills  Headache (location?): YES  Sinus Pressure: No  Conjunctivitis:  No  Ear Pain: no  Rhinorrhea: YES  Congestion: YES  Sore Throat: YES  Cough: YES-productive of clear to yellow sputum  Wheeze: No  Decreased Appetite: YES  Nausea: No  Vomiting: No  Diarrhea: No  Dysuria/Freq.: No  Dysuria or Hematuria: No  Fatigue/Achiness: YES- fatigue  Sick/Strep Exposure: YES- kids  Therapies tried and outcome: Ibuprofen, Tylenol, Dayquil; effective when he does take it but does not want to keep taking it          Review of Systems  Constitutional, HEENT, cardiovascular, pulmonary, GI, , musculoskeletal, neuro, skin, endocrine and psych systems are negative, except as otherwise noted.      Objective    /74 (BP Location: Right arm, Patient Position: Sitting, Cuff Size: Adult Large)   Pulse 82   Temp 98.2  F (36.8  C) (Tympanic)   Resp 20   Ht 1.651 m (5' 5\")   Wt 98.8 kg (217 lb 12.8 oz)   SpO2 95%   BMI 36.24 kg/m    Body mass index is 36.24 kg/m .  Physical Exam   GENERAL: alert and no distress  HENT: ear canals and TM's normal, nose and mouth without ulcers or lesions  NECK: no adenopathy, no asymmetry, masses, or scars  RESP: lungs clear to auscultation - no rales, rhonchi or wheezes  CV: regular rate and rhythm, normal S1 S2, no S3 or S4, no murmur, click or rub, no peripheral edema  ABDOMEN: soft, nontender, no hepatosplenomegaly, no masses and bowel sounds normal              Signed Electronically by: Shivani De Leon MD    "

## 2025-02-06 ENCOUNTER — PATIENT OUTREACH (OUTPATIENT)
Dept: CARE COORDINATION | Facility: CLINIC | Age: 40
End: 2025-02-06
Payer: COMMERCIAL

## 2025-02-20 ENCOUNTER — PATIENT OUTREACH (OUTPATIENT)
Dept: CARE COORDINATION | Facility: CLINIC | Age: 40
End: 2025-02-20
Payer: COMMERCIAL

## 2025-04-19 ENCOUNTER — HEALTH MAINTENANCE LETTER (OUTPATIENT)
Age: 40
End: 2025-04-19

## 2025-07-01 ENCOUNTER — OFFICE VISIT (OUTPATIENT)
Dept: FAMILY MEDICINE | Facility: CLINIC | Age: 40
End: 2025-07-01
Payer: COMMERCIAL

## 2025-07-01 ENCOUNTER — ANCILLARY PROCEDURE (OUTPATIENT)
Dept: GENERAL RADIOLOGY | Facility: CLINIC | Age: 40
End: 2025-07-01
Attending: FAMILY MEDICINE
Payer: COMMERCIAL

## 2025-07-01 VITALS
SYSTOLIC BLOOD PRESSURE: 137 MMHG | RESPIRATION RATE: 20 BRPM | BODY MASS INDEX: 37.15 KG/M2 | TEMPERATURE: 97.9 F | HEART RATE: 70 BPM | HEIGHT: 65 IN | DIASTOLIC BLOOD PRESSURE: 80 MMHG | WEIGHT: 223 LBS | OXYGEN SATURATION: 100 %

## 2025-07-01 DIAGNOSIS — S09.93XA FACIAL TRAUMA, INITIAL ENCOUNTER: ICD-10-CM

## 2025-07-01 DIAGNOSIS — S09.93XA FACIAL TRAUMA, INITIAL ENCOUNTER: Primary | ICD-10-CM

## 2025-07-01 PROBLEM — E66.01 MORBID OBESITY (H): Status: RESOLVED | Noted: 2024-03-08 | Resolved: 2025-07-01

## 2025-07-01 PROCEDURE — 1125F AMNT PAIN NOTED PAIN PRSNT: CPT | Performed by: FAMILY MEDICINE

## 2025-07-01 PROCEDURE — 3075F SYST BP GE 130 - 139MM HG: CPT | Performed by: FAMILY MEDICINE

## 2025-07-01 PROCEDURE — 70200 X-RAY EXAM OF EYE SOCKETS: CPT | Mod: TC | Performed by: RADIOLOGY

## 2025-07-01 PROCEDURE — 3079F DIAST BP 80-89 MM HG: CPT | Performed by: FAMILY MEDICINE

## 2025-07-01 PROCEDURE — 70140 X-RAY EXAM OF FACIAL BONES: CPT | Mod: TC | Performed by: RADIOLOGY

## 2025-07-01 PROCEDURE — 70110 X-RAY EXAM OF JAW 4/> VIEWS: CPT | Mod: TC | Performed by: RADIOLOGY

## 2025-07-01 PROCEDURE — 99214 OFFICE O/P EST MOD 30 MIN: CPT | Performed by: FAMILY MEDICINE

## 2025-07-01 ASSESSMENT — PAIN SCALES - GENERAL: PAINLEVEL_OUTOF10: MODERATE PAIN (5)
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
## 2025-07-01 NOTE — PATIENT INSTRUCTIONS
At [facility], we strive to deliver an exceptional experience to you, every time we see you. If you receive a survey, please let us know what we are doing well and/or what we could improve upon, as we do value your feedback.  If you have MyChart, you can expect to receive results automatically within 24 hours of their completion.  Your provider will send a note interpreting your results as well.   If you do not have MyChart, you should receive your results in about a week by mail.    Your care team:                            Family Medicine Internal Medicine   MD Tremaine Nolan, MD Sienna Queen, MD Clint Calvin, MD Viky Corado, PA-C DORI Odonnell, DORCAS Mckeon, MD Pediatrics   MD Carlita Tamayo, MD Lorraine Moran, PAJACEY Pizarro APRN CNP   MD Laine Wolff, MD Shandra Valencia, CNP      Same-Day Provider (No follow-up visits)    MARISA Jang PA-C     Clinic hours: Monday - Thursday 7 am-6 pm; Fridays 7 am-5 pm.   Urgent care: Monday - Friday 10 am- 8 pm; Saturday and Sunday 9 am-5 pm.    Clinic: [phone]       [facility]: Monday - Thursday 8 am - 7 pm; Friday 8 am - 6 pm  [facility]: [phone]     [facility] Scheduling: Monday - Friday 7 am - 7 pm; Saturday 7 am - 3:30 pm  [phone] [place] : [phone]

## 2025-07-02 ENCOUNTER — RESULTS FOLLOW-UP (OUTPATIENT)
Dept: FAMILY MEDICINE | Facility: CLINIC | Age: 40
End: 2025-07-02

## 2025-09-04 ENCOUNTER — PATIENT OUTREACH (OUTPATIENT)
Dept: CARE COORDINATION | Facility: CLINIC | Age: 40
End: 2025-09-04
Payer: COMMERCIAL